# Patient Record
Sex: MALE | Race: BLACK OR AFRICAN AMERICAN | NOT HISPANIC OR LATINO
[De-identification: names, ages, dates, MRNs, and addresses within clinical notes are randomized per-mention and may not be internally consistent; named-entity substitution may affect disease eponyms.]

---

## 2017-02-28 ENCOUNTER — APPOINTMENT (OUTPATIENT)
Dept: OPHTHALMOLOGY | Facility: CLINIC | Age: 72
End: 2017-02-28

## 2017-03-06 ENCOUNTER — APPOINTMENT (OUTPATIENT)
Dept: OPHTHALMOLOGY | Facility: CLINIC | Age: 72
End: 2017-03-06

## 2017-03-22 ENCOUNTER — APPOINTMENT (OUTPATIENT)
Dept: OPHTHALMOLOGY | Facility: CLINIC | Age: 72
End: 2017-03-22

## 2017-04-18 ENCOUNTER — APPOINTMENT (OUTPATIENT)
Dept: OPHTHALMOLOGY | Facility: CLINIC | Age: 72
End: 2017-04-18

## 2017-04-24 ENCOUNTER — APPOINTMENT (OUTPATIENT)
Dept: OPHTHALMOLOGY | Facility: CLINIC | Age: 72
End: 2017-04-24

## 2017-06-26 ENCOUNTER — APPOINTMENT (OUTPATIENT)
Dept: OPHTHALMOLOGY | Facility: CLINIC | Age: 72
End: 2017-06-26

## 2017-08-16 ENCOUNTER — APPOINTMENT (OUTPATIENT)
Dept: OPHTHALMOLOGY | Facility: CLINIC | Age: 72
End: 2017-08-16
Payer: COMMERCIAL

## 2017-08-16 PROCEDURE — 92133 CPTRZD OPH DX IMG PST SGM ON: CPT

## 2017-08-16 PROCEDURE — 92012 INTRM OPH EXAM EST PATIENT: CPT

## 2017-08-16 PROCEDURE — 92083 EXTENDED VISUAL FIELD XM: CPT

## 2017-09-13 ENCOUNTER — APPOINTMENT (OUTPATIENT)
Dept: OPHTHALMOLOGY | Facility: CLINIC | Age: 72
End: 2017-09-13

## 2017-10-18 ENCOUNTER — APPOINTMENT (OUTPATIENT)
Dept: OPHTHALMOLOGY | Facility: CLINIC | Age: 72
End: 2017-10-18
Payer: COMMERCIAL

## 2017-10-18 PROCEDURE — 92012 INTRM OPH EXAM EST PATIENT: CPT

## 2017-10-18 PROCEDURE — 92015 DETERMINE REFRACTIVE STATE: CPT

## 2018-01-10 ENCOUNTER — APPOINTMENT (OUTPATIENT)
Dept: OPHTHALMOLOGY | Facility: CLINIC | Age: 73
End: 2018-01-10
Payer: COMMERCIAL

## 2018-01-10 PROCEDURE — 92226: CPT | Mod: RT

## 2018-01-10 PROCEDURE — 92014 COMPRE OPH EXAM EST PT 1/>: CPT

## 2018-05-09 ENCOUNTER — APPOINTMENT (OUTPATIENT)
Dept: OPHTHALMOLOGY | Facility: CLINIC | Age: 73
End: 2018-05-09

## 2018-05-22 ENCOUNTER — APPOINTMENT (OUTPATIENT)
Dept: OPHTHALMOLOGY | Facility: CLINIC | Age: 73
End: 2018-05-22
Payer: COMMERCIAL

## 2018-05-22 PROCEDURE — 92012 INTRM OPH EXAM EST PATIENT: CPT

## 2018-05-22 PROCEDURE — 92083 EXTENDED VISUAL FIELD XM: CPT

## 2018-08-21 ENCOUNTER — APPOINTMENT (OUTPATIENT)
Dept: OPHTHALMOLOGY | Facility: CLINIC | Age: 73
End: 2018-08-21
Payer: COMMERCIAL

## 2018-08-21 PROCEDURE — 92012 INTRM OPH EXAM EST PATIENT: CPT

## 2018-08-21 PROCEDURE — 92025 CPTRIZED CORNEAL TOPOGRAPHY: CPT

## 2018-08-21 PROCEDURE — 92133 CPTRZD OPH DX IMG PST SGM ON: CPT

## 2018-12-18 ENCOUNTER — APPOINTMENT (OUTPATIENT)
Dept: OPHTHALMOLOGY | Facility: CLINIC | Age: 73
End: 2018-12-18
Payer: COMMERCIAL

## 2018-12-18 PROCEDURE — 92083 EXTENDED VISUAL FIELD XM: CPT

## 2018-12-18 PROCEDURE — 92012 INTRM OPH EXAM EST PATIENT: CPT

## 2019-01-07 ENCOUNTER — APPOINTMENT (OUTPATIENT)
Dept: OPHTHALMOLOGY | Facility: CLINIC | Age: 74
End: 2019-01-07
Payer: COMMERCIAL

## 2019-01-07 PROCEDURE — 92012 INTRM OPH EXAM EST PATIENT: CPT

## 2019-05-09 ENCOUNTER — APPOINTMENT (OUTPATIENT)
Dept: OPHTHALMOLOGY | Facility: CLINIC | Age: 74
End: 2019-05-09
Payer: COMMERCIAL

## 2019-05-09 ENCOUNTER — NON-APPOINTMENT (OUTPATIENT)
Age: 74
End: 2019-05-09

## 2019-05-09 PROCEDURE — 92250 FUNDUS PHOTOGRAPHY W/I&R: CPT

## 2019-05-09 PROCEDURE — 92083 EXTENDED VISUAL FIELD XM: CPT

## 2019-05-09 PROCEDURE — 92014 COMPRE OPH EXAM EST PT 1/>: CPT

## 2019-09-12 ENCOUNTER — NON-APPOINTMENT (OUTPATIENT)
Age: 74
End: 2019-09-12

## 2019-09-12 ENCOUNTER — APPOINTMENT (OUTPATIENT)
Dept: OPHTHALMOLOGY | Facility: CLINIC | Age: 74
End: 2019-09-12
Payer: COMMERCIAL

## 2019-09-12 PROCEDURE — 92133 CPTRZD OPH DX IMG PST SGM ON: CPT

## 2019-09-12 PROCEDURE — 92012 INTRM OPH EXAM EST PATIENT: CPT

## 2020-09-09 ENCOUNTER — APPOINTMENT (OUTPATIENT)
Dept: OPHTHALMOLOGY | Facility: CLINIC | Age: 75
End: 2020-09-09

## 2021-07-08 ENCOUNTER — TRANSCRIPTION ENCOUNTER (OUTPATIENT)
Age: 76
End: 2021-07-08

## 2021-11-28 ENCOUNTER — INPATIENT (INPATIENT)
Facility: HOSPITAL | Age: 76
LOS: 4 days | Discharge: HOME | End: 2021-12-03
Attending: INTERNAL MEDICINE | Admitting: INTERNAL MEDICINE
Payer: COMMERCIAL

## 2021-11-28 VITALS
OXYGEN SATURATION: 100 % | DIASTOLIC BLOOD PRESSURE: 66 MMHG | SYSTOLIC BLOOD PRESSURE: 146 MMHG | RESPIRATION RATE: 18 BRPM | HEART RATE: 82 BPM | TEMPERATURE: 100 F

## 2021-11-28 DIAGNOSIS — I25.701 ATHEROSCLEROSIS OF CORONARY ARTERY BYPASS GRAFT(S), UNSPECIFIED, WITH ANGINA PECTORIS WITH DOCUMENTED SPASM: Chronic | ICD-10-CM

## 2021-11-28 DIAGNOSIS — N40.0 BENIGN PROSTATIC HYPERPLASIA WITHOUT LOWER URINARY TRACT SYMPTOMS: ICD-10-CM

## 2021-11-28 DIAGNOSIS — I25.10 ATHEROSCLEROTIC HEART DISEASE OF NATIVE CORONARY ARTERY WITHOUT ANGINA PECTORIS: ICD-10-CM

## 2021-11-28 DIAGNOSIS — N39.0 URINARY TRACT INFECTION, SITE NOT SPECIFIED: ICD-10-CM

## 2021-11-28 DIAGNOSIS — E27.8 OTHER SPECIFIED DISORDERS OF ADRENAL GLAND: ICD-10-CM

## 2021-11-28 DIAGNOSIS — I10 ESSENTIAL (PRIMARY) HYPERTENSION: ICD-10-CM

## 2021-11-28 DIAGNOSIS — Z29.9 ENCOUNTER FOR PROPHYLACTIC MEASURES, UNSPECIFIED: ICD-10-CM

## 2021-11-28 DIAGNOSIS — A41.9 SEPSIS, UNSPECIFIED ORGANISM: ICD-10-CM

## 2021-11-28 LAB
ALBUMIN SERPL ELPH-MCNC: 3.2 G/DL — LOW (ref 3.3–5)
ALP SERPL-CCNC: 115 U/L — SIGNIFICANT CHANGE UP (ref 40–120)
ALT FLD-CCNC: 34 U/L — SIGNIFICANT CHANGE UP (ref 4–41)
ANION GAP SERPL CALC-SCNC: 11 MMOL/L — SIGNIFICANT CHANGE UP (ref 7–14)
APPEARANCE UR: ABNORMAL
AST SERPL-CCNC: 24 U/L — SIGNIFICANT CHANGE UP (ref 4–40)
BASE EXCESS BLDV CALC-SCNC: 0.6 MMOL/L — SIGNIFICANT CHANGE UP (ref -2–3)
BASOPHILS # BLD AUTO: 0.02 K/UL — SIGNIFICANT CHANGE UP (ref 0–0.2)
BASOPHILS NFR BLD AUTO: 0.1 % — SIGNIFICANT CHANGE UP (ref 0–2)
BILIRUB SERPL-MCNC: 1.4 MG/DL — HIGH (ref 0.2–1.2)
BILIRUB UR-MCNC: NEGATIVE — SIGNIFICANT CHANGE UP
BUN SERPL-MCNC: 9 MG/DL — SIGNIFICANT CHANGE UP (ref 7–23)
CA-I SERPL-SCNC: 1.25 MMOL/L — SIGNIFICANT CHANGE UP (ref 1.15–1.33)
CALCIUM SERPL-MCNC: 9.5 MG/DL — SIGNIFICANT CHANGE UP (ref 8.4–10.5)
CHLORIDE BLDV-SCNC: 100 MMOL/L — SIGNIFICANT CHANGE UP (ref 96–108)
CHLORIDE SERPL-SCNC: 99 MMOL/L — SIGNIFICANT CHANGE UP (ref 98–107)
CO2 BLDV-SCNC: 25.4 MMOL/L — SIGNIFICANT CHANGE UP (ref 22–26)
CO2 SERPL-SCNC: 23 MMOL/L — SIGNIFICANT CHANGE UP (ref 22–31)
COLOR SPEC: ABNORMAL
CREAT SERPL-MCNC: 1.03 MG/DL — SIGNIFICANT CHANGE UP (ref 0.5–1.3)
DIFF PNL FLD: ABNORMAL
EOSINOPHIL # BLD AUTO: 0 K/UL — SIGNIFICANT CHANGE UP (ref 0–0.5)
EOSINOPHIL NFR BLD AUTO: 0 % — SIGNIFICANT CHANGE UP (ref 0–6)
GAS PNL BLDV: 132 MMOL/L — LOW (ref 136–145)
GAS PNL BLDV: SIGNIFICANT CHANGE UP
GLUCOSE BLDV-MCNC: 113 MG/DL — HIGH (ref 70–99)
GLUCOSE SERPL-MCNC: 109 MG/DL — HIGH (ref 70–99)
GLUCOSE UR QL: NEGATIVE — SIGNIFICANT CHANGE UP
HCO3 BLDV-SCNC: 24 MMOL/L — SIGNIFICANT CHANGE UP (ref 22–29)
HCT VFR BLD CALC: 32.3 % — LOW (ref 39–50)
HCT VFR BLDA CALC: 34 % — LOW (ref 39–51)
HGB BLD CALC-MCNC: 11.4 G/DL — LOW (ref 13–17)
HGB BLD-MCNC: 11.4 G/DL — LOW (ref 13–17)
IANC: 13.31 K/UL — HIGH (ref 1.5–8.5)
IMM GRANULOCYTES NFR BLD AUTO: 1.5 % — SIGNIFICANT CHANGE UP (ref 0–1.5)
KETONES UR-MCNC: ABNORMAL
LACTATE BLDV-MCNC: 1.5 MMOL/L — SIGNIFICANT CHANGE UP (ref 0.5–2)
LEUKOCYTE ESTERASE UR-ACNC: ABNORMAL
LYMPHOCYTES # BLD AUTO: 0.86 K/UL — LOW (ref 1–3.3)
LYMPHOCYTES # BLD AUTO: 5.6 % — LOW (ref 13–44)
MCHC RBC-ENTMCNC: 33.7 PG — SIGNIFICANT CHANGE UP (ref 27–34)
MCHC RBC-ENTMCNC: 35.3 GM/DL — SIGNIFICANT CHANGE UP (ref 32–36)
MCV RBC AUTO: 95.6 FL — SIGNIFICANT CHANGE UP (ref 80–100)
MONOCYTES # BLD AUTO: 1 K/UL — HIGH (ref 0–0.9)
MONOCYTES NFR BLD AUTO: 6.5 % — SIGNIFICANT CHANGE UP (ref 2–14)
NEUTROPHILS # BLD AUTO: 13.31 K/UL — HIGH (ref 1.8–7.4)
NEUTROPHILS NFR BLD AUTO: 86.3 % — HIGH (ref 43–77)
NITRITE UR-MCNC: NEGATIVE — SIGNIFICANT CHANGE UP
NRBC # BLD: 0 /100 WBCS — SIGNIFICANT CHANGE UP
NRBC # FLD: 0 K/UL — SIGNIFICANT CHANGE UP
PCO2 BLDV: 35 MMHG — LOW (ref 42–55)
PH BLDV: 7.45 — HIGH (ref 7.32–7.43)
PH UR: 6.5 — SIGNIFICANT CHANGE UP (ref 5–8)
PLATELET # BLD AUTO: 143 K/UL — LOW (ref 150–400)
PO2 BLDV: 41 MMHG — SIGNIFICANT CHANGE UP
POTASSIUM BLDV-SCNC: 3.4 MMOL/L — LOW (ref 3.5–5.1)
POTASSIUM SERPL-MCNC: 3.7 MMOL/L — SIGNIFICANT CHANGE UP (ref 3.5–5.3)
POTASSIUM SERPL-SCNC: 3.7 MMOL/L — SIGNIFICANT CHANGE UP (ref 3.5–5.3)
PROT SERPL-MCNC: 6.8 G/DL — SIGNIFICANT CHANGE UP (ref 6–8.3)
PROT UR-MCNC: ABNORMAL
RBC # BLD: 3.38 M/UL — LOW (ref 4.2–5.8)
RBC # FLD: 11.7 % — SIGNIFICANT CHANGE UP (ref 10.3–14.5)
SAO2 % BLDV: 71.1 % — SIGNIFICANT CHANGE UP
SARS-COV-2 RNA SPEC QL NAA+PROBE: SIGNIFICANT CHANGE UP
SODIUM SERPL-SCNC: 133 MMOL/L — LOW (ref 135–145)
SP GR SPEC: 1.01 — SIGNIFICANT CHANGE UP (ref 1–1.05)
UROBILINOGEN FLD QL: SIGNIFICANT CHANGE UP
WBC # BLD: 15.42 K/UL — HIGH (ref 3.8–10.5)
WBC # FLD AUTO: 15.42 K/UL — HIGH (ref 3.8–10.5)

## 2021-11-28 PROCEDURE — 74177 CT ABD & PELVIS W/CONTRAST: CPT | Mod: 26,MA

## 2021-11-28 PROCEDURE — 99223 1ST HOSP IP/OBS HIGH 75: CPT

## 2021-11-28 PROCEDURE — 71045 X-RAY EXAM CHEST 1 VIEW: CPT | Mod: 26

## 2021-11-28 PROCEDURE — 99285 EMERGENCY DEPT VISIT HI MDM: CPT

## 2021-11-28 RX ORDER — PHENAZOPYRIDINE HCL 100 MG
100 TABLET ORAL EVERY 8 HOURS
Refills: 0 | Status: COMPLETED | OUTPATIENT
Start: 2021-11-28 | End: 2021-11-30

## 2021-11-28 RX ORDER — KETOROLAC TROMETHAMINE 30 MG/ML
15 SYRINGE (ML) INJECTION ONCE
Refills: 0 | Status: DISCONTINUED | OUTPATIENT
Start: 2021-11-28 | End: 2021-11-28

## 2021-11-28 RX ORDER — ENOXAPARIN SODIUM 100 MG/ML
40 INJECTION SUBCUTANEOUS EVERY 24 HOURS
Refills: 0 | Status: DISCONTINUED | OUTPATIENT
Start: 2021-11-28 | End: 2021-12-03

## 2021-11-28 RX ORDER — PIPERACILLIN AND TAZOBACTAM 4; .5 G/20ML; G/20ML
3.38 INJECTION, POWDER, LYOPHILIZED, FOR SOLUTION INTRAVENOUS EVERY 8 HOURS
Refills: 0 | Status: DISCONTINUED | OUTPATIENT
Start: 2021-11-28 | End: 2021-12-03

## 2021-11-28 RX ORDER — TAMSULOSIN HYDROCHLORIDE 0.4 MG/1
0.4 CAPSULE ORAL AT BEDTIME
Refills: 0 | Status: DISCONTINUED | OUTPATIENT
Start: 2021-11-28 | End: 2021-12-03

## 2021-11-28 RX ORDER — PHENAZOPYRIDINE HCL 100 MG
100 TABLET ORAL EVERY 8 HOURS
Refills: 0 | Status: DISCONTINUED | OUTPATIENT
Start: 2021-11-28 | End: 2021-11-28

## 2021-11-28 RX ORDER — FINASTERIDE 5 MG/1
5 TABLET, FILM COATED ORAL DAILY
Refills: 0 | Status: DISCONTINUED | OUTPATIENT
Start: 2021-11-28 | End: 2021-12-03

## 2021-11-28 RX ORDER — CEFTRIAXONE 500 MG/1
1000 INJECTION, POWDER, FOR SOLUTION INTRAMUSCULAR; INTRAVENOUS ONCE
Refills: 0 | Status: COMPLETED | OUTPATIENT
Start: 2021-11-28 | End: 2021-11-28

## 2021-11-28 RX ORDER — PIPERACILLIN AND TAZOBACTAM 4; .5 G/20ML; G/20ML
3.38 INJECTION, POWDER, LYOPHILIZED, FOR SOLUTION INTRAVENOUS ONCE
Refills: 0 | Status: COMPLETED | OUTPATIENT
Start: 2021-11-28 | End: 2021-11-28

## 2021-11-28 RX ORDER — PHENAZOPYRIDINE HCL 100 MG
100 TABLET ORAL ONCE
Refills: 0 | Status: COMPLETED | OUTPATIENT
Start: 2021-11-28 | End: 2021-11-28

## 2021-11-28 RX ORDER — SODIUM CHLORIDE 9 MG/ML
1000 INJECTION INTRAMUSCULAR; INTRAVENOUS; SUBCUTANEOUS ONCE
Refills: 0 | Status: COMPLETED | OUTPATIENT
Start: 2021-11-28 | End: 2021-11-28

## 2021-11-28 RX ORDER — ACETAMINOPHEN 500 MG
650 TABLET ORAL EVERY 6 HOURS
Refills: 0 | Status: DISCONTINUED | OUTPATIENT
Start: 2021-11-28 | End: 2021-12-03

## 2021-11-28 RX ORDER — ACETAMINOPHEN 500 MG
650 TABLET ORAL ONCE
Refills: 0 | Status: COMPLETED | OUTPATIENT
Start: 2021-11-28 | End: 2021-11-28

## 2021-11-28 RX ADMIN — TAMSULOSIN HYDROCHLORIDE 0.4 MILLIGRAM(S): 0.4 CAPSULE ORAL at 21:38

## 2021-11-28 RX ADMIN — SODIUM CHLORIDE 1000 MILLILITER(S): 9 INJECTION INTRAMUSCULAR; INTRAVENOUS; SUBCUTANEOUS at 13:24

## 2021-11-28 RX ADMIN — Medication 100 MILLIGRAM(S): at 13:33

## 2021-11-28 RX ADMIN — Medication 100 MILLIGRAM(S): at 21:38

## 2021-11-28 RX ADMIN — Medication 15 MILLIGRAM(S): at 13:25

## 2021-11-28 RX ADMIN — PIPERACILLIN AND TAZOBACTAM 200 GRAM(S): 4; .5 INJECTION, POWDER, LYOPHILIZED, FOR SOLUTION INTRAVENOUS at 16:17

## 2021-11-28 RX ADMIN — PIPERACILLIN AND TAZOBACTAM 25 GRAM(S): 4; .5 INJECTION, POWDER, LYOPHILIZED, FOR SOLUTION INTRAVENOUS at 20:18

## 2021-11-28 RX ADMIN — CEFTRIAXONE 100 MILLIGRAM(S): 500 INJECTION, POWDER, FOR SOLUTION INTRAMUSCULAR; INTRAVENOUS at 13:25

## 2021-11-28 RX ADMIN — SODIUM CHLORIDE 1000 MILLILITER(S): 9 INJECTION INTRAMUSCULAR; INTRAVENOUS; SUBCUTANEOUS at 14:30

## 2021-11-28 RX ADMIN — ENOXAPARIN SODIUM 40 MILLIGRAM(S): 100 INJECTION SUBCUTANEOUS at 21:39

## 2021-11-28 RX ADMIN — Medication 650 MILLIGRAM(S): at 13:15

## 2021-11-28 NOTE — ED PROVIDER NOTE - CLINICAL SUMMARY MEDICAL DECISION MAKING FREE TEXT BOX
74yo male pt with PMH HTN who complains of dysuria and pelvic pain since 4 days ago that has been worsening since. No urinary retention seen on US. Most likely UTI. will assess with labs, UA, and will empirically treat with antibiotics.

## 2021-11-28 NOTE — PHARMACOTHERAPY INTERVENTION NOTE - COMMENTS
patient fills at multiple pharmacies   unable to confirm meds with [facility] as pharmacy closed  however, med list updated with list provided with [facility] only

## 2021-11-28 NOTE — H&P ADULT - NSICDXPASTMEDICALHX_GEN_ALL_CORE_FT
PAST MEDICAL HISTORY:  BPH (benign prostatic hyperplasia)     CAD (coronary artery disease)     HTN (hypertension)

## 2021-11-28 NOTE — H&P ADULT - NSHPLABSRESULTS_GEN_ALL_CORE
11.4   15.42 )-----------( 143      ( 2021 13:33 )             32.3         133<L>  |  99  |  9   ----------------------------<  109<H>  3.7   |  23  |  1.03    Ca    9.5      2021 13:33    TPro  6.8  /  Alb  3.2<L>  /  TBili  1.4<H>  /  DBili  x   /  AST  24  /  ALT  34  /  AlkPhos  115        Urinalysis Basic - ( 2021 13:33 )    Color: Wendy / Appearance: Slightly Turbid / S.011 / pH: x  Gluc: x / Ketone: Moderate  / Bili: Negative / Urobili: <2 mg/dL   Blood: x / Protein: 100 mg/dL / Nitrite: Negative   Leuk Esterase: Moderate / RBC: 6-12 /HPF / WBC 4-6 /HPF   Sq Epi: x / Non Sq Epi: 3 /HPF / Bacteria: Negative    LIVER FUNCTIONS - ( 2021 13:33 )  Alb: 3.2 g/dL / Pro: 6.8 g/dL / ALK PHOS: 115 U/L / ALT: 34 U/L / AST: 24 U/L / GGT: x           CXR: clear lungs.  CT Abd+Pelvis w/IV con: Acute cystitis. Prostatomegaly with surrounding inflammation, which may be on the basis of prostatitis versus recent instrumentation. No abscess, free air or free fluid. 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 Indeterminate 2.7 cm left adrenal gland mass. Urinary Jeffers catheter in place.

## 2021-11-28 NOTE — H&P ADULT - ASSESSMENT
74 yo male PMHx CAD, s/p CABG x3, HTN and recently diagnosed BPH (w/ duarte in place) p/w dysuria x 5 days, now accompanied by fever and chills admitted to medicine for Sepsis secondary to UTI in a setting of BPH.

## 2021-11-28 NOTE — H&P ADULT - NSHPSOCIALHISTORY_GEN_ALL_CORE
Pt , lives with spouse. Denies smoking, drinking or drugs. Former smoker 1/2 ppd x 15 yrs, quit 10 years ago. Still works as a  and able to ambulate 2-3 blocks w/o difficulty.

## 2021-11-28 NOTE — H&P ADULT - ATTENDING COMMENTS
74 yo male PMHx CAD, s/p CABG x3, HTN and recently diagnosed BPH (w/ duarte in place) p/w dysuria x 5 days who is found to be septic in the setting of complicated ascending UTI with ureteropyelitis.     #Ureteropyelitis:   -fevers, tachycardia, and leukocytosis -- meets sepsis criteria  -previously on cipro -- given recent duarte, will need pseudomonas coverage; therefore, initiated Zosyn  -also with possible prostatomegaly with surrounding inflammation, which could signal prostatitis vs recent inflammation. Continue to monitor clinical course; given age, likely microbial etiology would be gram negative enterobacteriaceae   -given recent admission at [facility], would obtain records regarding any culture data  -f/u blood and urine cx    #BPH:   -c/w flomax and initiate finasteride    #Adrenal mass:   -o/p evaluation with adrenal mass protocol     Remainder of chronic problems as above

## 2021-11-28 NOTE — ED ADULT TRIAGE NOTE - CHIEF COMPLAINT QUOTE
Arrives from home reports has had 7 days of difficulty urinating, went to another facility and given medication and duarte catheter. But pt states duarte is not draining.

## 2021-11-28 NOTE — ED PROVIDER NOTE - ATTENDING CONTRIBUTION TO CARE
Dr. Mcclure:  I have personally performed a face to face bedside history and physical examination of this patient. I have discussed the history, examination, review of systems, assessment and plan of management with the resident. I have reviewed the electronic medical record and amended it to reflect my history, review of systems, physical exam, assessment and plan.    h/o BPH, was in urinary retention x 3 days, had a Jeffers placed, now c/o dysuria and pelvic pain. Dr. Mcclure:  I have personally performed a face to face bedside history and physical examination of this patient. I have discussed the history, examination, review of systems, assessment and plan of management with the resident. I have reviewed the electronic medical record and amended it to reflect my history, review of systems, physical exam, assessment and plan.    75M h/o HTN, BPH, was in urinary retention x 3 days, had a Jeffers placed, now c/o dysuria and pelvic pain.  Tm 101 in ED.      Exam:  - nad  - rrr  - ctab   -abd soft ntnd    A/P  - fever, suspect urine source  - sepsis labs  - abx

## 2021-11-28 NOTE — ED ADULT TRIAGE NOTE - PAIN: PRESENCE, MLM
complains of pain/discomfort Tazorac Pregnancy And Lactation Text: This medication is not safe during pregnancy. It is unknown if this medication is excreted in breast milk.

## 2021-11-28 NOTE — ED PROVIDER NOTE - NS ED ROS FT
CONST: no fevers, no chills  EYES: no pain, no vision changes  ENT: no sore throat, no ear pain, no change in hearing  CV: no chest pain, no leg swelling  RESP: no shortness of breath, no cough  ABD: no abdominal pain, no nausea, no vomiting, no diarrhea  :  no flank pain, no hematuria. Pos dysuria and lower mid pelvic pain  MSK: no back pain, no extremity pain  NEURO: no headache or additional neurologic complaints  HEME: no easy bleeding

## 2021-11-28 NOTE — ED PROVIDER NOTE - OBJECTIVE STATEMENT
76yo male pt PMH HTN and BPH who presents to the ED with complains of dysuria and pelvic pain since 4 days ago. Patient was evaluated 4 days ago for urinary retention and a duarte catheter was placed. Patient now presenting w dysuria since that has been worsening w time. Denies chills, fever, abd pain, n/v/d, chest pain, SOB.

## 2021-11-28 NOTE — ED PROVIDER NOTE - NSICDXFAMILYHX_GEN_ALL_CORE_FT
FAMILY HISTORY:  Mother  Still living? Unknown  Family history of acute myocardial infarction, Age at diagnosis: Age Unknown

## 2021-11-28 NOTE — H&P ADULT - HISTORY OF PRESENT ILLNESS
74 yo male PMHx CAD, s/p CABG x3, HTN and recently diagnosed BPH (w/ duarte in place) p/w dysuria x 5 days. Last Wednesday pt developed dysuria with burning on urination. Pt described it as "shards of glass." On Thursday he went to [facility] and was admitted there where he had CT Scan done and was told by end of work  up that he has a new onset of BPH. They put a duarte in him and discharged him with leg bag, Cipro, Tamsulosin and Pyridium. He was discharged yesterday. This morning pt became severe at duarte-penile site, 10/10 sharp pain, accompanied by subjective fever and chills, hence came to [facility] ED for further evaluation. He denies back pain, chest pain, sob, palpitations, nausea, vomiting, diarrhea, abdominal pain, or hematuria.    In ED pt found be febrile with leukocytosis and neutrophilia.

## 2021-11-28 NOTE — ED ADULT NURSE NOTE - OBJECTIVE STATEMENT
Pt. is A+Ox4 and screaming/growling in pain. He c/o pain to inside of penis. He reports he has had 7 days of difficulty urinating, Went to another facility 4 days ok and was given ABX and a duarte catheter. But pt states duarte is not draining. Leaking noted around duarte.

## 2021-11-28 NOTE — ED PROVIDER NOTE - PHYSICAL EXAMINATION
GENERAL: Awake, alert, NAD  HEENT: NC/AT, moist mucous membranes  LUNGS: CTAB, no wheezes or crackles   CARDIAC: RRR, no m/r/g  ABDOMEN: Soft, normal BS, non distended, no rebound, no guarding. mildly tender to palpation of lower mid pelvis  BACK: No midline spinal tenderness, no CVA tenderness  EXT: No edema, no calf tenderness, 2+ DP pulses bilaterally, no deformities.  NEURO: A&Ox3. Moving all extremities.  SKIN: Warm and dry. No rash.

## 2021-11-28 NOTE — H&P ADULT - NSICDXPASTSURGICALHX_GEN_ALL_CORE_FT
PAST SURGICAL HISTORY:  Atherosclerosis of coronary artery bypass graft(s), unspecified, with angina pectoris with documented spasm CABG x 2012

## 2021-11-28 NOTE — ED PROVIDER NOTE - NSICDXPASTSURGICALHX_GEN_ALL_CORE_FT
PAST SURGICAL HISTORY:  Atherosclerosis of coronary artery bypass graft(s), unspecified, with angina pectoris with documented spasm

## 2021-11-28 NOTE — H&P ADULT - PROBLEM SELECTOR PLAN 1
-continue IV Zosyn 3.375 Q8h  -Tamsulosin for BPH  -Tylenol PRN pain and Fever  -Pyridium for dysuria for 2 more days  -Follow up Bld Cx and UCx  -F/U COVID PCR

## 2021-11-29 LAB
ALBUMIN SERPL ELPH-MCNC: 3 G/DL — LOW (ref 3.3–5)
ALP SERPL-CCNC: 122 U/L — HIGH (ref 40–120)
ALT FLD-CCNC: 30 U/L — SIGNIFICANT CHANGE UP (ref 4–41)
ANION GAP SERPL CALC-SCNC: 11 MMOL/L — SIGNIFICANT CHANGE UP (ref 7–14)
AST SERPL-CCNC: 25 U/L — SIGNIFICANT CHANGE UP (ref 4–40)
BILIRUB SERPL-MCNC: 1.2 MG/DL — SIGNIFICANT CHANGE UP (ref 0.2–1.2)
BUN SERPL-MCNC: 13 MG/DL — SIGNIFICANT CHANGE UP (ref 7–23)
CALCIUM SERPL-MCNC: 9.3 MG/DL — SIGNIFICANT CHANGE UP (ref 8.4–10.5)
CHLORIDE SERPL-SCNC: 101 MMOL/L — SIGNIFICANT CHANGE UP (ref 98–107)
CO2 SERPL-SCNC: 23 MMOL/L — SIGNIFICANT CHANGE UP (ref 22–31)
COVID-19 NUCLEOCAPSID GAM AB INTERP: NEGATIVE — SIGNIFICANT CHANGE UP
COVID-19 NUCLEOCAPSID TOTAL GAM ANTIBODY RESULT: 0.1 INDEX — SIGNIFICANT CHANGE UP
COVID-19 SPIKE DOMAIN AB INTERP: NEGATIVE — SIGNIFICANT CHANGE UP
COVID-19 SPIKE DOMAIN ANTIBODY RESULT: 0.4 U/ML — SIGNIFICANT CHANGE UP
CREAT SERPL-MCNC: 1.17 MG/DL — SIGNIFICANT CHANGE UP (ref 0.5–1.3)
CULTURE RESULTS: NO GROWTH — SIGNIFICANT CHANGE UP
GLUCOSE SERPL-MCNC: 84 MG/DL — SIGNIFICANT CHANGE UP (ref 70–99)
HCT VFR BLD CALC: 33.2 % — LOW (ref 39–50)
HGB BLD-MCNC: 11.6 G/DL — LOW (ref 13–17)
MCHC RBC-ENTMCNC: 33.8 PG — SIGNIFICANT CHANGE UP (ref 27–34)
MCHC RBC-ENTMCNC: 34.9 GM/DL — SIGNIFICANT CHANGE UP (ref 32–36)
MCV RBC AUTO: 96.8 FL — SIGNIFICANT CHANGE UP (ref 80–100)
NRBC # BLD: 0 /100 WBCS — SIGNIFICANT CHANGE UP
NRBC # FLD: 0 K/UL — SIGNIFICANT CHANGE UP
PLATELET # BLD AUTO: 156 K/UL — SIGNIFICANT CHANGE UP (ref 150–400)
POTASSIUM SERPL-MCNC: 3.5 MMOL/L — SIGNIFICANT CHANGE UP (ref 3.5–5.3)
POTASSIUM SERPL-SCNC: 3.5 MMOL/L — SIGNIFICANT CHANGE UP (ref 3.5–5.3)
PROT SERPL-MCNC: 6.8 G/DL — SIGNIFICANT CHANGE UP (ref 6–8.3)
RBC # BLD: 3.43 M/UL — LOW (ref 4.2–5.8)
RBC # FLD: 11.9 % — SIGNIFICANT CHANGE UP (ref 10.3–14.5)
SARS-COV-2 IGG+IGM SERPL QL IA: 0.1 INDEX — SIGNIFICANT CHANGE UP
SARS-COV-2 IGG+IGM SERPL QL IA: 0.4 U/ML — SIGNIFICANT CHANGE UP
SARS-COV-2 IGG+IGM SERPL QL IA: NEGATIVE — SIGNIFICANT CHANGE UP
SARS-COV-2 IGG+IGM SERPL QL IA: NEGATIVE — SIGNIFICANT CHANGE UP
SODIUM SERPL-SCNC: 135 MMOL/L — SIGNIFICANT CHANGE UP (ref 135–145)
SPECIMEN SOURCE: SIGNIFICANT CHANGE UP
WBC # BLD: 11.76 K/UL — HIGH (ref 3.8–10.5)
WBC # FLD AUTO: 11.76 K/UL — HIGH (ref 3.8–10.5)

## 2021-11-29 PROCEDURE — 99232 SBSQ HOSP IP/OBS MODERATE 35: CPT

## 2021-11-29 RX ORDER — KETOROLAC TROMETHAMINE 30 MG/ML
15 SYRINGE (ML) INJECTION ONCE
Refills: 0 | Status: DISCONTINUED | OUTPATIENT
Start: 2021-11-29 | End: 2021-11-29

## 2021-11-29 RX ORDER — INFLUENZA VIRUS VACCINE 15; 15; 15; 15 UG/.5ML; UG/.5ML; UG/.5ML; UG/.5ML
0.7 SUSPENSION INTRAMUSCULAR ONCE
Refills: 0 | Status: COMPLETED | OUTPATIENT
Start: 2021-11-29 | End: 2021-11-29

## 2021-11-29 RX ORDER — LIDOCAINE HCL 20 MG/ML
5 VIAL (ML) INJECTION DAILY
Refills: 0 | Status: DISCONTINUED | OUTPATIENT
Start: 2021-11-29 | End: 2021-11-29

## 2021-11-29 RX ORDER — LIDOCAINE 4 G/100G
1 CREAM TOPICAL DAILY
Refills: 0 | Status: DISCONTINUED | OUTPATIENT
Start: 2021-11-29 | End: 2021-12-03

## 2021-11-29 RX ADMIN — Medication 650 MILLIGRAM(S): at 02:00

## 2021-11-29 RX ADMIN — PIPERACILLIN AND TAZOBACTAM 25 GRAM(S): 4; .5 INJECTION, POWDER, LYOPHILIZED, FOR SOLUTION INTRAVENOUS at 21:19

## 2021-11-29 RX ADMIN — Medication 15 MILLIGRAM(S): at 21:19

## 2021-11-29 RX ADMIN — PIPERACILLIN AND TAZOBACTAM 25 GRAM(S): 4; .5 INJECTION, POWDER, LYOPHILIZED, FOR SOLUTION INTRAVENOUS at 13:12

## 2021-11-29 RX ADMIN — Medication 15 MILLIGRAM(S): at 14:21

## 2021-11-29 RX ADMIN — Medication 15 MILLIGRAM(S): at 21:49

## 2021-11-29 RX ADMIN — Medication 100 MILLIGRAM(S): at 21:20

## 2021-11-29 RX ADMIN — TAMSULOSIN HYDROCHLORIDE 0.4 MILLIGRAM(S): 0.4 CAPSULE ORAL at 21:18

## 2021-11-29 RX ADMIN — LIDOCAINE 1 APPLICATION(S): 4 CREAM TOPICAL at 04:40

## 2021-11-29 RX ADMIN — PIPERACILLIN AND TAZOBACTAM 25 GRAM(S): 4; .5 INJECTION, POWDER, LYOPHILIZED, FOR SOLUTION INTRAVENOUS at 05:31

## 2021-11-29 RX ADMIN — Medication 650 MILLIGRAM(S): at 01:04

## 2021-11-29 RX ADMIN — ENOXAPARIN SODIUM 40 MILLIGRAM(S): 100 INJECTION SUBCUTANEOUS at 21:18

## 2021-11-29 RX ADMIN — Medication 15 MILLIGRAM(S): at 13:39

## 2021-11-29 RX ADMIN — Medication 100 MILLIGRAM(S): at 05:31

## 2021-11-29 RX ADMIN — Medication 15 MILLIGRAM(S): at 06:05

## 2021-11-29 RX ADMIN — FINASTERIDE 5 MILLIGRAM(S): 5 TABLET, FILM COATED ORAL at 13:01

## 2021-11-29 RX ADMIN — Medication 100 MILLIGRAM(S): at 16:03

## 2021-11-29 NOTE — PHYSICAL THERAPY INITIAL EVALUATION ADULT - ADDITIONAL COMMENTS
Pt reports that he lives alone in a private house; although rents out a section; with no steps to enter; and a flight of stairs to negotiate to bedroom; (+)1 handrail. Prior to hospital admission pt was completely independent and used no assistive device with ambulation. Pt denies any recent falls.    Pt left comfortable in bed, NAD, all lines intact, all precautions maintained, with call bell in reach, and RN aware of PT evaluation.

## 2021-11-29 NOTE — PHYSICAL THERAPY INITIAL EVALUATION ADULT - PATIENT PROFILE REVIEW, REHAB EVAL
No Formal Activity Order in the Computer; spoke with PB Nj and PB Valenzuela prior to PT evaluation--> Pt OK for PT consult/OOB activity./yes

## 2021-11-29 NOTE — PHYSICAL THERAPY INITIAL EVALUATION ADULT - PERTINENT HX OF CURRENT PROBLEM, REHAB EVAL
76 yo male PMHx CAD, s/p CABG x3, HTN and recently diagnosed BPH (w/ duarte in place) p/w dysuria x 5 days, now accompanied by fever and chills admitted to medicine for Sepsis secondary to UTI in a setting of BPH.

## 2021-11-29 NOTE — PROGRESS NOTE ADULT - SUBJECTIVE AND OBJECTIVE BOX
PROGRESS NOTE:     Patient is a 76y old  Male who presents with a chief complaint of Urosepsis (2021 17:11)    SUBJECTIVE / OVERNIGHT EVENTS: Patient seen and evaluated at bedside. NO acute distress evident, no overnight events. Reports feeling well, no sob, chest pain, abd pain, nausea, vomiting, dizziness, palpitations.     ADDITIONAL REVIEW OF SYSTEMS:    MEDICATIONS  (STANDING):  enoxaparin Injectable 40 milliGRAM(s) SubCutaneous every 24 hours  finasteride 5 milliGRAM(s) Oral daily  influenza  Vaccine (HIGH DOSE) 0.7 milliLiter(s) IntraMuscular once  lidocaine 2% Gel 1 Application(s) Topical daily  phenazopyridine 100 milliGRAM(s) Oral every 8 hours  piperacillin/tazobactam IVPB.. 3.375 Gram(s) IV Intermittent every 8 hours  tamsulosin 0.4 milliGRAM(s) Oral at bedtime    MEDICATIONS  (PRN):  acetaminophen     Tablet .. 650 milliGRAM(s) Oral every 6 hours PRN Temp greater or equal to 38C (100.4F), Mild Pain (1 - 3), Moderate Pain (4 - 6)    CAPILLARY BLOOD GLUCOSE    I&O's Summary    PHYSICAL EXAM:  Vital Signs Last 24 Hrs  T(C): 37.1 (2021 13:00), Max: 37.1 (2021 13:00)  T(F): 98.8 (2021 13:00), Max: 98.8 (2021 13:00)  HR: 70 (2021 13:00) (60 - 70)  BP: 142/57 (2021 13:00) (105/52 - 142/57)  BP(mean): --  RR: 20 (2021 13:00) (18 - 20)  SpO2: 100% (2021 13:00) (96% - 100%)    CONSTITUTIONAL: NAD, well-developed, comfortable.  RESPIRATORY: Normal respiratory effort; lungs are clear to auscultation bilaterally  CARDIOVASCULAR: Regular rate and rhythm, normal S1 and S2  No lower extremity edema; Peripheral pulses are 2+ bilaterally  ABDOMEN: Nontender to palpation, normoactive bowel sounds  MUSCLOSKELETAL: no clubbing or cyanosis of digits; no joint swelling or tenderness to palpation  PSYCH: A+O to person, place, and time; affect appropriate    LABS: reviewed.                        11.6   11.76 )-----------( 156      ( 2021 07:09 )             33.2     11    135  |  101  |  13  ----------------------------<  84  3.5   |  23  |  1.17    Ca    9.3      2021 07:09    TPro  6.8  /  Alb  3.0<L>  /  TBili  1.2  /  DBili  x   /  AST  25  /  ALT  30  /  AlkPhos  122<H>      Urinalysis Basic - ( 2021 13:33 )    Color: Wendy / Appearance: Slightly Turbid / S.011 / pH: x  Gluc: x / Ketone: Moderate  / Bili: Negative / Urobili: <2 mg/dL   Blood: x / Protein: 100 mg/dL / Nitrite: Negative   Leuk Esterase: Moderate / RBC: 6-12 /HPF / WBC 4-6 /HPF   Sq Epi: x / Non Sq Epi: 3 /HPF / Bacteria: Negative    Culture - Urine (collected 2021 13:45)  Source: Clean Catch Clean Catch (Midstream)  Final Report (2021 15:46):    No growth    RADIOLOGY & ADDITIONAL TESTS:  Results Reviewed:   Imaging Personally Reviewed:  Electrocardiogram Personally Reviewed:    COORDINATION OF CARE:  Care Discussed with Consultants/Other Providers [Y/N]:  Prior or Outpatient Records Reviewed [Y/N]:

## 2021-11-30 LAB
ANION GAP SERPL CALC-SCNC: 9 MMOL/L — SIGNIFICANT CHANGE UP (ref 7–14)
BASOPHILS # BLD AUTO: 0.03 K/UL — SIGNIFICANT CHANGE UP (ref 0–0.2)
BASOPHILS NFR BLD AUTO: 0.3 % — SIGNIFICANT CHANGE UP (ref 0–2)
BUN SERPL-MCNC: 13 MG/DL — SIGNIFICANT CHANGE UP (ref 7–23)
CALCIUM SERPL-MCNC: 9.2 MG/DL — SIGNIFICANT CHANGE UP (ref 8.4–10.5)
CHLORIDE SERPL-SCNC: 104 MMOL/L — SIGNIFICANT CHANGE UP (ref 98–107)
CO2 SERPL-SCNC: 25 MMOL/L — SIGNIFICANT CHANGE UP (ref 22–31)
CREAT SERPL-MCNC: 1.19 MG/DL — SIGNIFICANT CHANGE UP (ref 0.5–1.3)
EOSINOPHIL # BLD AUTO: 0.04 K/UL — SIGNIFICANT CHANGE UP (ref 0–0.5)
EOSINOPHIL NFR BLD AUTO: 0.5 % — SIGNIFICANT CHANGE UP (ref 0–6)
GLUCOSE SERPL-MCNC: 92 MG/DL — SIGNIFICANT CHANGE UP (ref 70–99)
HCT VFR BLD CALC: 32 % — LOW (ref 39–50)
HGB BLD-MCNC: 10.8 G/DL — LOW (ref 13–17)
IANC: 6.8 K/UL — SIGNIFICANT CHANGE UP (ref 1.5–8.5)
IMM GRANULOCYTES NFR BLD AUTO: 0.9 % — SIGNIFICANT CHANGE UP (ref 0–1.5)
LYMPHOCYTES # BLD AUTO: 1.16 K/UL — SIGNIFICANT CHANGE UP (ref 1–3.3)
LYMPHOCYTES # BLD AUTO: 13.5 % — SIGNIFICANT CHANGE UP (ref 13–44)
MAGNESIUM SERPL-MCNC: 2.2 MG/DL — SIGNIFICANT CHANGE UP (ref 1.6–2.6)
MCHC RBC-ENTMCNC: 33.8 GM/DL — SIGNIFICANT CHANGE UP (ref 32–36)
MCHC RBC-ENTMCNC: 33.8 PG — SIGNIFICANT CHANGE UP (ref 27–34)
MCV RBC AUTO: 100 FL — SIGNIFICANT CHANGE UP (ref 80–100)
MONOCYTES # BLD AUTO: 0.48 K/UL — SIGNIFICANT CHANGE UP (ref 0–0.9)
MONOCYTES NFR BLD AUTO: 5.6 % — SIGNIFICANT CHANGE UP (ref 2–14)
NEUTROPHILS # BLD AUTO: 6.8 K/UL — SIGNIFICANT CHANGE UP (ref 1.8–7.4)
NEUTROPHILS NFR BLD AUTO: 79.2 % — HIGH (ref 43–77)
NRBC # BLD: 0 /100 WBCS — SIGNIFICANT CHANGE UP
NRBC # FLD: 0 K/UL — SIGNIFICANT CHANGE UP
PHOSPHATE SERPL-MCNC: 3.2 MG/DL — SIGNIFICANT CHANGE UP (ref 2.5–4.5)
PLATELET # BLD AUTO: 177 K/UL — SIGNIFICANT CHANGE UP (ref 150–400)
POTASSIUM SERPL-MCNC: 3.6 MMOL/L — SIGNIFICANT CHANGE UP (ref 3.5–5.3)
POTASSIUM SERPL-SCNC: 3.6 MMOL/L — SIGNIFICANT CHANGE UP (ref 3.5–5.3)
RBC # BLD: 3.2 M/UL — LOW (ref 4.2–5.8)
RBC # FLD: 11.9 % — SIGNIFICANT CHANGE UP (ref 10.3–14.5)
SODIUM SERPL-SCNC: 138 MMOL/L — SIGNIFICANT CHANGE UP (ref 135–145)
WBC # BLD: 8.59 K/UL — SIGNIFICANT CHANGE UP (ref 3.8–10.5)
WBC # FLD AUTO: 8.59 K/UL — SIGNIFICANT CHANGE UP (ref 3.8–10.5)

## 2021-11-30 PROCEDURE — 99232 SBSQ HOSP IP/OBS MODERATE 35: CPT

## 2021-11-30 RX ORDER — PHENAZOPYRIDINE HCL 100 MG
200 TABLET ORAL EVERY 8 HOURS
Refills: 0 | Status: DISCONTINUED | OUTPATIENT
Start: 2021-11-30 | End: 2021-12-02

## 2021-11-30 RX ORDER — KETOROLAC TROMETHAMINE 30 MG/ML
30 SYRINGE (ML) INJECTION ONCE
Refills: 0 | Status: DISCONTINUED | OUTPATIENT
Start: 2021-11-30 | End: 2021-11-30

## 2021-11-30 RX ADMIN — Medication 100 MILLIGRAM(S): at 05:27

## 2021-11-30 RX ADMIN — PIPERACILLIN AND TAZOBACTAM 25 GRAM(S): 4; .5 INJECTION, POWDER, LYOPHILIZED, FOR SOLUTION INTRAVENOUS at 21:57

## 2021-11-30 RX ADMIN — Medication 100 MILLIGRAM(S): at 17:52

## 2021-11-30 RX ADMIN — Medication 30 MILLIGRAM(S): at 11:05

## 2021-11-30 RX ADMIN — ENOXAPARIN SODIUM 40 MILLIGRAM(S): 100 INJECTION SUBCUTANEOUS at 21:58

## 2021-11-30 RX ADMIN — Medication 30 MILLIGRAM(S): at 10:46

## 2021-11-30 RX ADMIN — PIPERACILLIN AND TAZOBACTAM 25 GRAM(S): 4; .5 INJECTION, POWDER, LYOPHILIZED, FOR SOLUTION INTRAVENOUS at 13:52

## 2021-11-30 RX ADMIN — TAMSULOSIN HYDROCHLORIDE 0.4 MILLIGRAM(S): 0.4 CAPSULE ORAL at 21:58

## 2021-11-30 RX ADMIN — LIDOCAINE 1 APPLICATION(S): 4 CREAM TOPICAL at 05:28

## 2021-11-30 RX ADMIN — FINASTERIDE 5 MILLIGRAM(S): 5 TABLET, FILM COATED ORAL at 13:51

## 2021-11-30 RX ADMIN — PIPERACILLIN AND TAZOBACTAM 25 GRAM(S): 4; .5 INJECTION, POWDER, LYOPHILIZED, FOR SOLUTION INTRAVENOUS at 05:28

## 2021-11-30 NOTE — PROGRESS NOTE ADULT - SUBJECTIVE AND OBJECTIVE BOX
PROGRESS NOTE:     Patient is a 76y old  Male who presents with a chief complaint of Urosepsis (29 Nov 2021 17:13)    SUBJECTIVE / OVERNIGHT EVENTS: Patient seen and evaluated at bedside. No acute distress evident, no sob, chest pain, abd pain, nausea, vomiting.     ADDITIONAL REVIEW OF SYSTEMS:    MEDICATIONS  (STANDING):  enoxaparin Injectable 40 milliGRAM(s) SubCutaneous every 24 hours  finasteride 5 milliGRAM(s) Oral daily  influenza  Vaccine (HIGH DOSE) 0.7 milliLiter(s) IntraMuscular once  lidocaine 2% Gel 1 Application(s) Topical daily  piperacillin/tazobactam IVPB.. 3.375 Gram(s) IV Intermittent every 8 hours  tamsulosin 0.4 milliGRAM(s) Oral at bedtime    MEDICATIONS  (PRN):  acetaminophen     Tablet .. 650 milliGRAM(s) Oral every 6 hours PRN Temp greater or equal to 38C (100.4F), Mild Pain (1 - 3), Moderate Pain (4 - 6)    CAPILLARY BLOOD GLUCOSE    I&O's Summary    29 Nov 2021 07:01  -  30 Nov 2021 07:00  --------------------------------------------------------  IN: 0 mL / OUT: 850 mL / NET: -850 mL    PHYSICAL EXAM:  Vital Signs Last 24 Hrs  T(C): 37 (30 Nov 2021 17:53), Max: 37.3 (30 Nov 2021 05:40)  T(F): 98.6 (30 Nov 2021 17:53), Max: 99.2 (30 Nov 2021 05:40)  HR: 70 (30 Nov 2021 17:53) (66 - 80)  BP: 133/62 (30 Nov 2021 17:53) (106/61 - 146/64)  BP(mean): --  RR: 18 (30 Nov 2021 17:53) (18 - 19)  SpO2: 99% (30 Nov 2021 17:53) (96% - 99%)    CONSTITUTIONAL: NAD, well-developed, comfortable appearing.   RESPIRATORY: Normal respiratory effort; lungs are clear to auscultation bilaterally  CARDIOVASCULAR: Regular rate and rhythm, normal S1 and S2  No lower extremity edema; Peripheral pulses are 2+ bilaterally  ABDOMEN: Nontender to palpation, normoactive bowel sounds  MUSCLOSKELETAL: no clubbing or cyanosis of digits; no joint swelling or tenderness to palpation  PSYCH: A+O to person, place, and time; affect appropriate    LABS: reviewed.                         10.8   8.59  )-----------( 177      ( 30 Nov 2021 06:50 )             32.0     11-30    138  |  104  |  13  ----------------------------<  92  3.6   |  25  |  1.19    Ca    9.2      30 Nov 2021 06:50  Phos  3.2     11-30  Mg     2.20     11-30    TPro  6.8  /  Alb  3.0<L>  /  TBili  1.2  /  DBili  x   /  AST  25  /  ALT  30  /  AlkPhos  122<H>  11-29    Culture - Blood (collected 28 Nov 2021 18:29)  Source: .Blood Blood-Peripheral  Preliminary Report (29 Nov 2021 19:01):    No growth to date.    Culture - Blood (collected 28 Nov 2021 18:29)  Source: .Blood Blood-Peripheral  Preliminary Report (29 Nov 2021 19:01):    No growth to date.    Culture - Urine (collected 28 Nov 2021 13:45)  Source: Clean Catch Clean Catch (Midstream)  Final Report (29 Nov 2021 15:46):    No growth    RADIOLOGY & ADDITIONAL TESTS:  Results Reviewed:   Imaging Personally Reviewed:  Electrocardiogram Personally Reviewed:    COORDINATION OF CARE:  Care Discussed with Consultants/Other Providers [Y/N]:  Prior or Outpatient Records Reviewed [Y/N]:

## 2021-12-01 ENCOUNTER — TRANSCRIPTION ENCOUNTER (OUTPATIENT)
Age: 76
End: 2021-12-01

## 2021-12-01 PROCEDURE — 99232 SBSQ HOSP IP/OBS MODERATE 35: CPT

## 2021-12-01 RX ORDER — ACETAMINOPHEN 500 MG
2 TABLET ORAL
Qty: 0 | Refills: 0 | DISCHARGE
Start: 2021-12-01

## 2021-12-01 RX ORDER — ASPIRIN/CALCIUM CARB/MAGNESIUM 324 MG
1 TABLET ORAL
Qty: 0 | Refills: 0 | DISCHARGE

## 2021-12-01 RX ORDER — TAMSULOSIN HYDROCHLORIDE 0.4 MG/1
1 CAPSULE ORAL
Qty: 0 | Refills: 0 | DISCHARGE

## 2021-12-01 RX ORDER — PHENAZOPYRIDINE HCL 100 MG
1 TABLET ORAL
Qty: 6 | Refills: 0
Start: 2021-12-01 | End: 2021-12-02

## 2021-12-01 RX ORDER — KETOROLAC TROMETHAMINE 30 MG/ML
15 SYRINGE (ML) INJECTION ONCE
Refills: 0 | Status: DISCONTINUED | OUTPATIENT
Start: 2021-12-01 | End: 2021-12-01

## 2021-12-01 RX ORDER — MOXIFLOXACIN HYDROCHLORIDE TABLETS, 400 MG 400 MG/1
1 TABLET, FILM COATED ORAL
Qty: 0 | Refills: 0 | DISCHARGE

## 2021-12-01 RX ORDER — PHENAZOPYRIDINE HCL 100 MG
1 TABLET ORAL
Qty: 0 | Refills: 0 | DISCHARGE

## 2021-12-01 RX ORDER — TAMSULOSIN HYDROCHLORIDE 0.4 MG/1
1 CAPSULE ORAL
Qty: 30 | Refills: 0
Start: 2021-12-01 | End: 2021-12-30

## 2021-12-01 RX ORDER — FINASTERIDE 5 MG/1
1 TABLET, FILM COATED ORAL
Qty: 30 | Refills: 0
Start: 2021-12-01 | End: 2021-12-30

## 2021-12-01 RX ORDER — MOXIFLOXACIN HYDROCHLORIDE TABLETS, 400 MG 400 MG/1
1 TABLET, FILM COATED ORAL
Qty: 8 | Refills: 0
Start: 2021-12-01 | End: 2021-12-04

## 2021-12-01 RX ADMIN — PIPERACILLIN AND TAZOBACTAM 25 GRAM(S): 4; .5 INJECTION, POWDER, LYOPHILIZED, FOR SOLUTION INTRAVENOUS at 12:01

## 2021-12-01 RX ADMIN — Medication 15 MILLIGRAM(S): at 03:10

## 2021-12-01 RX ADMIN — PIPERACILLIN AND TAZOBACTAM 25 GRAM(S): 4; .5 INJECTION, POWDER, LYOPHILIZED, FOR SOLUTION INTRAVENOUS at 21:37

## 2021-12-01 RX ADMIN — ENOXAPARIN SODIUM 40 MILLIGRAM(S): 100 INJECTION SUBCUTANEOUS at 21:37

## 2021-12-01 RX ADMIN — TAMSULOSIN HYDROCHLORIDE 0.4 MILLIGRAM(S): 0.4 CAPSULE ORAL at 21:36

## 2021-12-01 RX ADMIN — LIDOCAINE 1 APPLICATION(S): 4 CREAM TOPICAL at 04:53

## 2021-12-01 RX ADMIN — FINASTERIDE 5 MILLIGRAM(S): 5 TABLET, FILM COATED ORAL at 12:03

## 2021-12-01 RX ADMIN — Medication 15 MILLIGRAM(S): at 02:40

## 2021-12-01 RX ADMIN — PIPERACILLIN AND TAZOBACTAM 25 GRAM(S): 4; .5 INJECTION, POWDER, LYOPHILIZED, FOR SOLUTION INTRAVENOUS at 04:53

## 2021-12-01 NOTE — PROVIDER CONTACT NOTE (OTHER) - REASON
Pt c/o dysuria
patient was unaware of d/c planning and does not feel comfortable leaving as he was last told he would be seen by urology while in the hospital

## 2021-12-01 NOTE — PROVIDER CONTACT NOTE (OTHER) - ACTION/TREATMENT ORDERED:
Toradol one time dose
will hand off to primary team that patient feels unsafe with discharge to be followed up in AM

## 2021-12-01 NOTE — PROVIDER CONTACT NOTE (OTHER) - ASSESSMENT
patient was unaware of d/c planning and does not feel comfortable leaving as he was last told he would be seen by urology while in the hospital. Patient is still having extreme pain and difficulty only when urinating and is very unsure of going home in this condition is respectfully asking if his issues can be addressed prior to discharge
Pt is A&Ox4. Pt c/o dysuria at 8/10 pain feeling burning and sharp pains intermittently. No acute resp. distress noted. Afebrile. VS Stable.

## 2021-12-01 NOTE — DISCHARGE NOTE PROVIDER - NSDCMRMEDTOKEN_GEN_ALL_CORE_FT
Cipro 500 mg oral tablet: 1 tab(s) orally every 12 hours for 7 days dispensed on 11/27/2021  Ecotrin Adult Low Strength 81 mg oral delayed release tablet: 1 tab(s) orally once a day  phenazopyridine 200 mg oral tablet: 1 tab(s) orally 3 times a day (after meals), As Needed  tamsulosin 0.4 mg oral capsule: 1 cap(s) orally once a day   acetaminophen 325 mg oral tablet: 2 tab(s) orally every 6 hours, As needed, Temp greater or equal to 38C (100.4F), Mild Pain (1 - 3), Moderate Pain (4 - 6)  Cipro 500 mg oral tablet: 1 tab(s) orally every 12 hours   finasteride 5 mg oral tablet: 1 tab(s) orally once a day  phenazopyridine 200 mg oral tablet: 1 tab(s) orally every 8 hours, As needed, dysuria  tamsulosin 0.4 mg oral capsule: 1 cap(s) orally once a day (at bedtime)   acetaminophen 325 mg oral tablet: 2 tab(s) orally every 6 hours, As needed, Temp greater or equal to 38C (100.4F), Mild Pain (1 - 3), Moderate Pain (4 - 6)  belladonna-opium 16.2 mg-30 mg rectal suppository: 1 suppository(ies) rectally every 12 hours MDD:2 supp / day  Cipro 500 mg oral tablet: 1 tab(s) orally every 12 hours   finasteride 5 mg oral tablet: 1 tab(s) orally once a day  Motrin 400 mg oral tablet: 1 tab(s) orally every 8 hours, As Needed  phenazopyridine 200 mg oral tablet: 1 tab(s) orally every 8 hours, As needed, dysuria  tamsulosin 0.4 mg oral capsule: 2 cap(s) orally once a day (at bedtime)

## 2021-12-01 NOTE — DISCHARGE NOTE PROVIDER - CARE PROVIDER_API CALL
Morro Roldan; EVAN)  Urology  [address] [place] [address], [place] [address]  Phone: [phone]  Fax: [phone]  Follow Up Time: 1-3 days    MD Biju, PCP  Phone: (   )    -  Fax: (   )    -  Follow Up Time: 1 week    MD Biju,   PCP  Phone: (   )    -  Fax: (   )    -  Established Patient  Follow Up Time: 1 week   Morro Roldan; EVAN)  Urology  [address] [place] [address], [place] [address]  Phone: [phone]  Fax: [phone]  Follow Up Time: 1-3 days    MD Biju, PCP  Phone: (   )    -  Fax: (   )    -  Follow Up Time: 1 week    Alexsander Hatch)  Urology  [address] [place] [address], [place] [address]  Phone: [phone]  Fax: [phone]  Follow Up Time:

## 2021-12-01 NOTE — DISCHARGE NOTE PROVIDER - PROVIDER TOKENS
PROVIDER:[TOKEN:[[id number]],FOLLOWUP:[1-3 days]],FREE:[LAST:[MD Biju],FIRST:[PCP],PHONE:[(   )    -],FAX:[(   )    -],FOLLOWUP:[1 week]],FREE:[LAST:[MD Biju],PHONE:[(   )    -],FAX:[(   )    -],ADDRESS:[PCP],FOLLOWUP:[1 week],ESTABLISHEDPATIENT:[T]] PROVIDER:[TOKEN:[[id number]],FOLLOWUP:[1-3 days]],FREE:[LAST:[MD Biju],FIRST:[PCP],PHONE:[(   )    -],FAX:[(   )    -],FOLLOWUP:[1 week]],PROVIDER:[TOKEN:[[id number]]]

## 2021-12-01 NOTE — PROVIDER CONTACT NOTE (OTHER) - BACKGROUND
Pt admitted for UTI with newly diagnosed BPH and S/P duarte removal for TOV
patient admitted for BPH

## 2021-12-01 NOTE — PROVIDER CONTACT NOTE (OTHER) - RECOMMENDATIONS
patient is uncomfortable with discharge and wants to follow up in AM as he does not feel discharge is safe
Toradol as pt endorses no relief with Tylenol

## 2021-12-01 NOTE — DISCHARGE NOTE PROVIDER - CARE PROVIDERS DIRECT ADDRESSES
[email or web address],DirectAddress_Unknown,DirectAddress_Unknown ,[email or web address]

## 2021-12-01 NOTE — PROVIDER CONTACT NOTE (OTHER) - SITUATION
patient was unaware of d/c planning and does not feel comfortable leaving as he was last told he would be seen by urology while in the hospital
Pt c/o dysuria

## 2021-12-01 NOTE — DISCHARGE NOTE PROVIDER - NSDCCPCAREPLAN_GEN_ALL_CORE_FT
PRINCIPAL DISCHARGE DIAGNOSIS  Diagnosis: UTI (urinary tract infection)  Assessment and Plan of Treatment: Continue antibiotics as directed and monitor for signs/symptoms of infection, such as, fever/chills, burning/pain with urination, urinary frequency/hesitancy, cloudy urine, or blood in urine.      SECONDARY DISCHARGE DIAGNOSES  Diagnosis: HTN (hypertension)  Assessment and Plan of Treatment: Continue blood pressure medication regimen as directed. Monitor for any visual changes, headaches or dizziness.  Monitor blood pressure regularly.  Follow up with your PCP for further management for high blood pressure.    Diagnosis: BPH (benign prostatic hyperplasia)  Assessment and Plan of Treatment: Continue Tamsulosin and Finasteride. Outpatient Urology follow up on discharge.     PRINCIPAL DISCHARGE DIAGNOSIS  Diagnosis: UTI (urinary tract infection)  Assessment and Plan of Treatment: Continue antibiotics Cipro as directed for two more days and monitor for signs/symptoms of infection, such as, fever/chills, burning/pain with urination, urinary frequency/hesitancy, cloudy urine, or blood in urine. Continue taking Motrin as needed on full stomach and follow up with [facility] for further eval of BPH and voiding symptoms [address] [place] [address], [place] [address]  Phone: [phone]      SECONDARY DISCHARGE DIAGNOSES  Diagnosis: BPH (benign prostatic hyperplasia)  Assessment and Plan of Treatment: Continue Tamsulosin dose increased to 2 caps 0.8mg daily and Finasteride as prescribed. Outpatient Urology follow up on discharge with [address], [place] [address], Phone: [phone]    Diagnosis: HTN (hypertension)  Assessment and Plan of Treatment: Continue blood pressure medication regimen as directed. Monitor for any visual changes, headaches or dizziness.  Monitor blood pressure regularly.  Follow up with your PCP for further management for high blood pressure.    Diagnosis: Adrenal mass  Assessment and Plan of Treatment: Incidentally found on CT A/P. Please follow up as outpatient for further evaluation with adrenal mass protocol

## 2021-12-01 NOTE — DISCHARGE NOTE PROVIDER - HOSPITAL COURSE
A 74 yo male PMHx CAD, s/p CABG x3, HTN and recently diagnosed BPH (w/ duarte in place) p/w dysuria x 5 days, now accompanied by fever and chills admitted to medicine for Sepsis secondary to UTI/ureteropyelitis in a setting of BPH.    Sepsis due to urinary tract infection.   - CT findings c/w Ureteropyelitis, ?prostatitis.   - Continue IV Zosyn 3.375 Q8h for pseudomonal coverage , recent duarte.   - Transitioned to Ciprofloxacin BID for total 7 days.   - Tamsulosin/Finasteride for BPH.   - Tylenol PRN pain and Fever  - Pyridium for dysuria PRN not to exceed 3 days.   - Blood cultures, urine cultures NGTD.   - COVID negative.    BPH (benign prostatic hyperplasia).   - Continue Tamsulosin 0.4mg qhs and started on Finasteride 5mg daily  - Passed TOV overnight.   - Outpatient Urology follow up on discharge.    CAD (coronary artery disease).   - c/w ASA.    HTN (hypertension).   - Currently normotensive  - Monitor BP daily  - DASH diet.    Adrenal mass.   - Incidentally found on CT A/P.   - Oupatient evaluation with adrenal mass protocol.    Need for prophylactic measure.   Lovenox 40mg SC Q24h    Patient is clinically and hemodynamically stable for discharge today.   DC discussed with patient, team, attending.   DC planning time: 34 min in coordinating DC plan with patient/team.   Outpatient follow up with PCP in 1 week post DC.   Outpatient follow up with Urology in 1 week post DC.  A 76 yo male PMHx CAD, s/p CABG x3, HTN and recently diagnosed BPH (w/ duarte in place) p/w dysuria x 5 days, now accompanied by fever and chills admitted to medicine for Sepsis secondary to UTI/ureteropyelitis in a setting of BPH.    Sepsis due to urinary tract infection. Met sepsis criteria on admission.   - CT findings c/w Ureteropyelitis, ?prostatitis.   - Continue IV Zosyn 3.375 Q8h for pseudomonal coverage , recent duarte.   - Transitioned to Ciprofloxacin BID for total 7 days.   - Tamsulosin/Finasteride for BPH.   - Tylenol PRN pain and Fever  - Pyridium for dysuria PRN not to exceed 3 days.   - Blood cultures, urine cultures NGTD.   - COVID negative.    BPH (benign prostatic hyperplasia).   - Continue Tamsulosin 0.4mg qhs and started on Finasteride 5mg daily  - Passed TOV overnight.   - Outpatient Urology follow up on discharge.    CAD (coronary artery disease).   - c/w ASA.    HTN (hypertension).   - Currently normotensive  - Monitor BP daily  - DASH diet.    Adrenal mass.   - Incidentally found on CT A/P.   - Oupatient evaluation with adrenal mass protocol.    Need for prophylactic measure.   Lovenox 40mg SC Q24h    Patient is clinically and hemodynamically stable for discharge today.   DC discussed with patient, team, attending.   DC planning time: 34 min in coordinating DC plan with patient/team.   Outpatient follow up with PCP in 1 week post DC.   Outpatient follow up with Urology in 1 week post DC.  A 74 yo male PMHx CAD, s/p CABG x3, HTN and recently diagnosed BPH (w/ duarte in place) p/w dysuria x 5 days, now accompanied by fever and chills admitted to medicine for Sepsis secondary to UTI/ureteropyelitis in a setting of BPH.    Sepsis due to urinary tract infection. Met sepsis criteria on admission.   - CT findings c/w Ureteropyelitis, ?prostatitis.   - Continue IV Zosyn 3.375 Q8h for pseudomonal coverage , recent duarte.   - Transitioned to Ciprofloxacin BID for total 7 days.   - Tamsulosin/Finasteride for BPH.   - Tylenol PRN pain and Fever  - Pyridium for dysuria PRN not to exceed 3 days.   - Blood cultures, urine cultures NGTD.   - COVID negative.    BPH (benign prostatic hyperplasia).   - Continue Tamsulosin 0.4mg qhs and started on Finasteride 5mg daily  - Passed TOV overnight.   - Outpatient Urology follow up on discharge.    CAD (coronary artery disease).   - c/w ASA.    HTN (hypertension).   - Currently normotensive  - Monitor BP daily  - DASH diet.    Adrenal mass.   - Incidentally found on CT A/P.   - Oupatient evaluation with adrenal mass protocol.    Need for prophylactic measure.   Lovenox 40mg SC Q24h    Patient is clinically and hemodynamically stable for discharge today.   DC discussed with patient, team, attending.   DC planning time: 34 min in coordinating DC plan with patient/team.   Outpatient follow up with PCP in 1 week post DC.   Outpatient follow up with Urology in 1 week post DC.  A 76 yo male PMHx CAD, s/p CABG x3, HTN and recently diagnosed BPH (w/ duarte in place) p/w dysuria x 5 days, now accompanied by fever and chills admitted to medicine for Sepsis secondary to UTI/ureteropyelitis in a setting of BPH    Urosepsis- UA- pos, BCx- NTD, UCx- negative, CXR- clear lungs  - CT Abd+Pelvis w/IV con: Acute cystitis. Prostatomegaly with surrounding inflammation, which may be on the basis of prostatitis versus recent instrumentation. No abscess, free air or free fluid. 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 Indeterminate 2.7 cm left adrenal gland mass. Urinary Duarte catheter in place. Pt was treated on IV Zosyn 3.375 Q8h, can d/c on Cipro po x7 days    BPH- Continue Tamsulosin 0.4mg qhs and started on Finasteride 5mg daily, 11/29 Duarte d/c passed TOV  - Penile pain- -Uro rec: C/w Flomax, would increase to 0.8mg qd, Would recommend empiric trial of Flomax 0.8mg and ibuprofen as needed for prostatitis, f/u with [facility] for further eval of BPH and voiding symptoms    CAD- c/w ASA    HTN- Currently normotensive. Monitor BP daily    Adrenal mass- Incidentally found on CT A/P  - O/p evaluation with adrenal mass protocol    Need for prophylactic measure- Lovenox 40mg SC Q24h      Patient is clinically and hemodynamically stable for discharge today.   DC planning time: 34 min in coordinating DC plan with patient/team.   Outpatient follow up with PCP in 1 week post DC.   Outpatient follow up with Urology in 1 week post DC.  A 74 yo male PMHx CAD, s/p CABG x3, HTN and recently diagnosed BPH (w/ duarte in place) p/w dysuria x 5 days, now accompanied by fever and chills admitted to medicine for Sepsis secondary to UTI/ureteropyelitis in a setting of BPH    Urosepsis- UA- pos, BCx- NTD, UCx- negative, CXR- clear lungs  - CT Abd+Pelvis w/IV con: Acute cystitis. Prostatomegaly with surrounding inflammation, which may be on the basis of prostatitis versus recent instrumentation. No abscess, free air or free fluid. 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 Indeterminate 2.7 cm left adrenal gland mass. Urinary Duarte catheter in place. Pt was treated on IV Zosyn 3.375 Q8h, can d/c on Cipro po x7 days    BPH- Continue Tamsulosin 0.4mg qhs and started on Finasteride 5mg daily, 11/29 Duarte d/c passed TOV  - Penile pain- -Uro rec: C/w Flomax, increased to 0.8mg qd, Would recommend empiric trial of Flomax 0.8mg and ibuprofen as needed for prostatitis, f/u with [facility] for further eval of BPH and voiding symptoms    CAD- c/w ASA    HTN- Currently normotensive. Monitor BP daily    Adrenal mass- Incidentally found on CT A/P. O/p evaluation with adrenal mass protocol    Need for prophylactic measure- Lovenox 40mg SC Q24h      Patient is clinically and hemodynamically stable for discharge today.   DC planning time: 44 min in coordinating DC plan with patient/team.   Outpatient follow up with PCP in 1 week post DC.   Outpatient follow up with Urology in 1 week post DC.

## 2021-12-01 NOTE — PROGRESS NOTE ADULT - SUBJECTIVE AND OBJECTIVE BOX
PROGRESS NOTE:     Patient is a 76y old  Male who presents with a chief complaint of Urosepsis (01 Dec 2021 14:31)    SUBJECTIVE / OVERNIGHT EVENTS: Patient seen and evaluated at bedside. NO acute distress evident, no overnight events. Reports no sob, chest pain, abdominal pain, nausea, vomiting. Periodically gets pain episode when urinating, short lived , self resolves.     ADDITIONAL REVIEW OF SYSTEMS:    MEDICATIONS  (STANDING):  enoxaparin Injectable 40 milliGRAM(s) SubCutaneous every 24 hours  finasteride 5 milliGRAM(s) Oral daily  influenza  Vaccine (HIGH DOSE) 0.7 milliLiter(s) IntraMuscular once  lidocaine 2% Gel 1 Application(s) Topical daily  piperacillin/tazobactam IVPB.. 3.375 Gram(s) IV Intermittent every 8 hours  tamsulosin 0.4 milliGRAM(s) Oral at bedtime    MEDICATIONS  (PRN):  acetaminophen     Tablet .. 650 milliGRAM(s) Oral every 6 hours PRN Temp greater or equal to 38C (100.4F), Mild Pain (1 - 3), Moderate Pain (4 - 6)  phenazopyridine 200 milliGRAM(s) Oral every 8 hours PRN dysuria    CAPILLARY BLOOD GLUCOSE    I&O's Summary    30 Nov 2021 07:01  -  01 Dec 2021 07:00  --------------------------------------------------------  IN: 0 mL / OUT: 900 mL / NET: -900 mL    01 Dec 2021 07:01  -  01 Dec 2021 14:42  --------------------------------------------------------  IN: 0 mL / OUT: 750 mL / NET: -750 mL    PHYSICAL EXAM:  Vital Signs Last 24 Hrs  T(C): 37.1 (01 Dec 2021 12:00), Max: 37.3 (30 Nov 2021 21:53)  T(F): 98.7 (01 Dec 2021 12:00), Max: 99.1 (30 Nov 2021 21:53)  HR: 62 (01 Dec 2021 12:00) (60 - 70)  BP: 136/62 (01 Dec 2021 12:00) (129/62 - 155/67)  BP(mean): --  RR: 17 (01 Dec 2021 12:00) (17 - 18)  SpO2: 98% (01 Dec 2021 12:00) (98% - 100%)    CONSTITUTIONAL: NAD, well-developed, elderly, comfortable appearing.   RESPIRATORY: Normal respiratory effort; lungs are clear to auscultation bilaterally  CARDIOVASCULAR: Regular rate and rhythm, normal S1 and S2  No lower extremity edema; Peripheral pulses are 2+ bilaterally  ABDOMEN: Nontender to palpation, normoactive bowel sounds  MUSCLOSKELETAL: no clubbing or cyanosis of digits; no joint swelling or tenderness to palpation  PSYCH: A+O to person, place, and time; affect appropriate    LABS: reviewed.                         10.8   8.59  )-----------( 177      ( 30 Nov 2021 06:50 )             32.0     11-30    138  |  104  |  13  ----------------------------<  92  3.6   |  25  |  1.19    Ca    9.2      30 Nov 2021 06:50  Phos  3.2     11-30  Mg     2.20     11-30    Culture - Blood (collected 28 Nov 2021 18:29)  Source: .Blood Blood-Peripheral  Preliminary Report (29 Nov 2021 19:01):    No growth to date.    Culture - Blood (collected 28 Nov 2021 18:29)  Source: .Blood Blood-Peripheral  Preliminary Report (29 Nov 2021 19:01):    No growth to date.    RADIOLOGY & ADDITIONAL TESTS:  Results Reviewed:   Imaging Personally Reviewed:  Electrocardiogram Personally Reviewed:    COORDINATION OF CARE:  Care Discussed with Consultants/Other Providers [Y/N]:  Prior or Outpatient Records Reviewed [Y/N]:   PROGRESS NOTE:     Patient is a 76y old  Male who presents with a chief complaint of Urosepsis (01 Dec 2021 14:31)    SUBJECTIVE / OVERNIGHT EVENTS: Patient seen and evaluated at bedside. NO acute distress evident, no overnight events. Reports no sob, chest pain, abdominal pain, nausea, vomiting. Periodically gets pain episode when urinating, short lived , self resolves.     ADDITIONAL REVIEW OF SYSTEMS:    MEDICATIONS  (STANDING):  enoxaparin Injectable 40 milliGRAM(s) SubCutaneous every 24 hour  finasteride 5 milliGRAM(s) Oral daily  influenza  Vaccine (HIGH DOSE) 0.7 milliLiter(s) IntraMuscular once  lidocaine 2% Gel 1 Application(s) Topical daily  piperacillin/tazobactam IVPB.. 3.375 Gram(s) IV Intermittent every 8 hours  tamsulosin 0.4 milliGRAM(s) Oral at bedtime    MEDICATIONS  (PRN):  acetaminophen     Tablet .. 650 milliGRAM(s) Oral every 6 hours PRN Temp greater or equal to 38C (100.4F), Mild Pain (1 - 3), Moderate Pain (4 - 6)  phenazopyridine 200 milliGRAM(s) Oral every 8 hours PRN dysuria    CAPILLARY BLOOD GLUCOSE    I&O's Summary    30 Nov 2021 07:01  -  01 Dec 2021 07:00  --------------------------------------------------------  IN: 0 mL / OUT: 900 mL / NET: -900 mL    01 Dec 2021 07:01  -  01 Dec 2021 14:42  --------------------------------------------------------  IN: 0 mL / OUT: 750 mL / NET: -750 mL    PHYSICAL EXAM:  Vital Signs Last 24 Hrs  T(C): 37.1 (01 Dec 2021 12:00), Max: 37.3 (30 Nov 2021 21:53)  T(F): 98.7 (01 Dec 2021 12:00), Max: 99.1 (30 Nov 2021 21:53)  HR: 62 (01 Dec 2021 12:00) (60 - 70)  BP: 136/62 (01 Dec 2021 12:00) (129/62 - 155/67)  BP(mean): --  RR: 17 (01 Dec 2021 12:00) (17 - 18)  SpO2: 98% (01 Dec 2021 12:00) (98% - 100%)    CONSTITUTIONAL: NAD, well-developed, elderly, comfortable appearing.   RESPIRATORY: Normal respiratory effort; lungs are clear to auscultation bilaterally  CARDIOVASCULAR: Regular rate and rhythm, normal S1 and S2  No lower extremity edema; Peripheral pulses are 2+ bilaterally  ABDOMEN: Nontender to palpation, normoactive bowel sounds  MUSCLOSKELETAL: no clubbing or cyanosis of digits; no joint swelling or tenderness to palpation  PSYCH: A+O to person, place, and time; affect appropriate    LABS: reviewed.                         10.8   8.59  )-----------( 177      ( 30 Nov 2021 06:50 )             32.0     11-30    138  |  104  |  13  ----------------------------<  92  3.6   |  25  |  1.19    Ca    9.2      30 Nov 2021 06:50  Phos  3.2     11-30  Mg     2.20     11-30    Culture - Blood (collected 28 Nov 2021 18:29)  Source: .Blood Blood-Peripheral  Preliminary Report (29 Nov 2021 19:01):    No growth to date.    Culture - Blood (collected 28 Nov 2021 18:29)  Source: .Blood Blood-Peripheral  Preliminary Report (29 Nov 2021 19:01):    No growth to date.    RADIOLOGY & ADDITIONAL TESTS:  Results Reviewed:   Imaging Personally Reviewed:  Electrocardiogram Personally Reviewed:    COORDINATION OF CARE:  Care Discussed with Consultants/Other Providers [Y/N]:  Prior or Outpatient Records Reviewed [Y/N]:

## 2021-12-02 LAB
ANION GAP SERPL CALC-SCNC: 9 MMOL/L — SIGNIFICANT CHANGE UP (ref 7–14)
BUN SERPL-MCNC: 9 MG/DL — SIGNIFICANT CHANGE UP (ref 7–23)
CALCIUM SERPL-MCNC: 9.2 MG/DL — SIGNIFICANT CHANGE UP (ref 8.4–10.5)
CHLORIDE SERPL-SCNC: 107 MMOL/L — SIGNIFICANT CHANGE UP (ref 98–107)
CO2 SERPL-SCNC: 23 MMOL/L — SIGNIFICANT CHANGE UP (ref 22–31)
CREAT SERPL-MCNC: 1.09 MG/DL — SIGNIFICANT CHANGE UP (ref 0.5–1.3)
GLUCOSE SERPL-MCNC: 101 MG/DL — HIGH (ref 70–99)
HCT VFR BLD CALC: 30.9 % — LOW (ref 39–50)
HGB BLD-MCNC: 10.6 G/DL — LOW (ref 13–17)
MAGNESIUM SERPL-MCNC: 2.1 MG/DL — SIGNIFICANT CHANGE UP (ref 1.6–2.6)
MCHC RBC-ENTMCNC: 33.3 PG — SIGNIFICANT CHANGE UP (ref 27–34)
MCHC RBC-ENTMCNC: 34.3 GM/DL — SIGNIFICANT CHANGE UP (ref 32–36)
MCV RBC AUTO: 97.2 FL — SIGNIFICANT CHANGE UP (ref 80–100)
NRBC # BLD: 0 /100 WBCS — SIGNIFICANT CHANGE UP
NRBC # FLD: 0 K/UL — SIGNIFICANT CHANGE UP
PHOSPHATE SERPL-MCNC: 2.5 MG/DL — SIGNIFICANT CHANGE UP (ref 2.5–4.5)
PLATELET # BLD AUTO: 236 K/UL — SIGNIFICANT CHANGE UP (ref 150–400)
POTASSIUM SERPL-MCNC: 4.5 MMOL/L — SIGNIFICANT CHANGE UP (ref 3.5–5.3)
POTASSIUM SERPL-SCNC: 4.5 MMOL/L — SIGNIFICANT CHANGE UP (ref 3.5–5.3)
RBC # BLD: 3.18 M/UL — LOW (ref 4.2–5.8)
RBC # FLD: 11.9 % — SIGNIFICANT CHANGE UP (ref 10.3–14.5)
SODIUM SERPL-SCNC: 139 MMOL/L — SIGNIFICANT CHANGE UP (ref 135–145)
WBC # BLD: 6.51 K/UL — SIGNIFICANT CHANGE UP (ref 3.8–10.5)
WBC # FLD AUTO: 6.51 K/UL — SIGNIFICANT CHANGE UP (ref 3.8–10.5)

## 2021-12-02 PROCEDURE — 99232 SBSQ HOSP IP/OBS MODERATE 35: CPT

## 2021-12-02 RX ORDER — POLYETHYLENE GLYCOL 3350 17 G/17G
17 POWDER, FOR SOLUTION ORAL ONCE
Refills: 0 | Status: COMPLETED | OUTPATIENT
Start: 2021-12-02 | End: 2021-12-02

## 2021-12-02 RX ORDER — ATROPA BELLADONNA AND OPIUM 16.2; 6 MG/1; MG/1
1 SUPPOSITORY RECTAL
Refills: 0 | Status: COMPLETED | OUTPATIENT
Start: 2021-12-02 | End: 2021-12-03

## 2021-12-02 RX ORDER — PHENAZOPYRIDINE HCL 100 MG
200 TABLET ORAL EVERY 8 HOURS
Refills: 0 | Status: COMPLETED | OUTPATIENT
Start: 2021-12-02 | End: 2021-12-03

## 2021-12-02 RX ADMIN — PIPERACILLIN AND TAZOBACTAM 25 GRAM(S): 4; .5 INJECTION, POWDER, LYOPHILIZED, FOR SOLUTION INTRAVENOUS at 19:49

## 2021-12-02 RX ADMIN — LIDOCAINE 1 APPLICATION(S): 4 CREAM TOPICAL at 04:39

## 2021-12-02 RX ADMIN — ATROPA BELLADONNA AND OPIUM 1 SUPPOSITORY(S): 16.2; 6 SUPPOSITORY RECTAL at 21:18

## 2021-12-02 RX ADMIN — Medication 200 MILLIGRAM(S): at 21:18

## 2021-12-02 RX ADMIN — ENOXAPARIN SODIUM 40 MILLIGRAM(S): 100 INJECTION SUBCUTANEOUS at 21:18

## 2021-12-02 RX ADMIN — ATROPA BELLADONNA AND OPIUM 1 SUPPOSITORY(S): 16.2; 6 SUPPOSITORY RECTAL at 11:45

## 2021-12-02 RX ADMIN — Medication 200 MILLIGRAM(S): at 11:43

## 2021-12-02 RX ADMIN — TAMSULOSIN HYDROCHLORIDE 0.4 MILLIGRAM(S): 0.4 CAPSULE ORAL at 21:18

## 2021-12-02 RX ADMIN — ATROPA BELLADONNA AND OPIUM 1 SUPPOSITORY(S): 16.2; 6 SUPPOSITORY RECTAL at 19:41

## 2021-12-02 RX ADMIN — FINASTERIDE 5 MILLIGRAM(S): 5 TABLET, FILM COATED ORAL at 11:45

## 2021-12-02 RX ADMIN — PIPERACILLIN AND TAZOBACTAM 25 GRAM(S): 4; .5 INJECTION, POWDER, LYOPHILIZED, FOR SOLUTION INTRAVENOUS at 04:38

## 2021-12-02 RX ADMIN — PIPERACILLIN AND TAZOBACTAM 25 GRAM(S): 4; .5 INJECTION, POWDER, LYOPHILIZED, FOR SOLUTION INTRAVENOUS at 11:46

## 2021-12-02 NOTE — CONSULT NOTE ADULT - SUBJECTIVE AND OBJECTIVE BOX
HPI:  74 yo male PMHx CAD, s/p CABG x3, HTN and recently diagnosed BPH (w/ duarte in place) p/w dysuria x 5 days. Last Wednesday pt developed dysuria with burning on urination. Pt described it as "shards of glass." Last week went to [facility] where a Duarte was placed at the ED there. After returning home, started to experience severe penile pain w/ urination. Presented to [facility] ED with Duarte in place. Said to have UTI/sepsis and admitted to medical service. Urology asked for further eval.     He has a hx of BPH, previously saw Dr. Rogers at [facility] but has not seen him in some time. In past has had difficulty urinating w/ urinary frequency during the day and night. Currently most bothersome is severe pain w/ urination. Describes it as shards of glass passing. No hematuria. No personal/family hx of prostate cancer. Has been on Flomax intermittently in past. No prior hx of pelvic surgery/radiation. No other major  hx beyond the BPH. Previously had Duarte in however, now removed. PVR 72cc.      (28 Nov 2021 17:11)      PAST MEDICAL & SURGICAL HISTORY:  CAD (coronary artery disease)    HTN (hypertension)    BPH (benign prostatic hyperplasia)    Atherosclerosis of coronary artery bypass graft(s), unspecified, with angina pectoris with documented spasm  CABG x 2012        MEDICATIONS  (STANDING):  belladonna 16.2 mG/opium 30 mg Suppository 1 Suppository(s) Rectal two times a day  enoxaparin Injectable 40 milliGRAM(s) SubCutaneous every 24 hours  finasteride 5 milliGRAM(s) Oral daily  influenza  Vaccine (HIGH DOSE) 0.7 milliLiter(s) IntraMuscular once  lidocaine 2% Gel 1 Application(s) Topical daily  phenazopyridine 200 milliGRAM(s) Oral every 8 hours  piperacillin/tazobactam IVPB.. 3.375 Gram(s) IV Intermittent every 8 hours  tamsulosin 0.4 milliGRAM(s) Oral at bedtime    MEDICATIONS  (PRN):  acetaminophen     Tablet .. 650 milliGRAM(s) Oral every 6 hours PRN Temp greater or equal to 38C (100.4F), Mild Pain (1 - 3), Moderate Pain (4 - 6)      FAMILY HISTORY:  Family history of acute myocardial infarction (Mother)        Allergies    No Known Allergies    Intolerances        SOCIAL HISTORY:    REVIEW OF SYSTEMS: Otherwise negative as stated in HPI    Physical Exam  Vital signs  T(C): 37.1 (12-02-21 @ 12:37), Max: 37.4 (12-01-21 @ 20:30)  HR: 66 (12-02-21 @ 12:37)  BP: 144/62 (12-02-21 @ 12:37)  SpO2: 99% (12-02-21 @ 12:37)  Wt(kg): --    Output    OUT:    Voided (mL): 750 mL  Total OUT: 750 mL    Total NET: -750 mL      OUT:    Voided (mL): 650 mL  Total OUT: 650 mL    Total NET: -650 mL          Gen: awake and alert. NAD.   Pulm: Normal work of breathing on RA  : tender enlarged prostate. not boggy.   Psych:  AOx3        LABS:      12-02 @ 06:36    WBC 6.51  / Hct 30.9  / SCr 1.09     12-02    139  |  107  |  9   ----------------------------<  101<H>  4.5   |  23  |  1.09    Ca    9.2      02 Dec 2021 06:36  Phos  2.5     12-02  Mg     2.10     12-02            Urine Cx:   Culture - Urine (11.28.21 @ 13:45)   Specimen Source: Clean Catch Clean Catch (Midstream)   Culture Results:   No growth    RADIOLOGY:  < from: CT Abdomen and Pelvis w/ IV Cont (11.28.21 @ 14:28) >    EXAM:  CT ABDOMEN AND PELVIS IC        PROCEDURE DATE:  Nov 28 2021         INTERPRETATION:  CLINICAL INFORMATION: BPH with dysuria and pelvic pain, status post Duarte catheter placement for urinary retention.    COMPARISON: None.    CONTRAST/COMPLICATIONS:  IV Contrast: Omnipaque 350  90 cc administered   10 cc discarded  Oral Contrast: NONE  Complications: None reported at time of study completion    PROCEDURE:  CT of the Abdomen and Pelvis was performed.  Sagittal and coronal reformats were performed.    FINDINGS:  LOWER CHEST: Within normal limits.    LIVER: Within normal limits.  BILE DUCTS: Normal caliber.  GALLBLADDER: Within normal limits.  SPLEEN: Within normal limits.  PANCREAS: Within normal limits.  ADRENALS: 2.7 cm left adrenal gland mass with focal calcification. Nodular thickening of the right adrenal gland without discrete nodule.  KIDNEYS/URETERS: Left renal cysts, including a 5.2 cm exophytic cyst in the upper pole. No renal mass or hydronephrosis. 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    BLADDER: Mild bladder wall thickening with perivesical fat infiltration. A urinary Duarte catheter in place with associated locules of anti--dependent gas within the bladder, likely secondary to underlying Duarte.  REPRODUCTIVE ORGANS: Prostate gland is enlarged with surrounding infiltration of the prostate gland and seminal vesicles. No intraprostatic abscess.    BOWEL: Scattered colonic diverticulosis. No bowel obstruction or inflammation. Appendix is not visualized.  PERITONEUM: No ascites or free air.  VESSELS: Atherosclerotic changes. A stent within the left common iliac vein with nonspecific peripheral internal calcification noted.  RETROPERITONEUM/LYMPH NODES: No lymphadenopathy.  ABDOMINAL WALL: Tiny fat-containing inguinal hernias bilaterally.  BONES: Status post sternotomy. Mild degenerative changes of the spine.    IMPRESSION:    Acute cystitis. Prostatomegaly with surrounding inflammation, which may be on the basis of prostatitisversus recent instrumentation. No abscess, free air or free fluid.    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    Indeterminate 2.7 cm left adrenal gland mass. Further evaluation with nonemergent MRI or CT (adrenal mass protocol) is suggested for further characterization.    Urinary Duarte catheter in place.    --- End of Report ---            WESLEY VACA MD; Fellow Radiology  This document has been electronically signed.  AURA BEAULIEU MD; Attending Radiologist  This document has been electronically signed. Nov 28 2021  3:18PM    < end of copied text >

## 2021-12-02 NOTE — CONSULT NOTE ADULT - ASSESSMENT
76M hx of BPH. Urology evaluating for penile pain/dysuria. Symptoms likely 2/2 BPH and prostatitis.     --Labs reviewed. UCx negative.   --CT reviewed. Enlarged prostate ~100cc gland.   --Given clean urine culture, would suspect inflammatory/noninfectious prostatitis.   --C/w Flomax, would increase to 0.8mg qd  --C/w finasteride  --Would recommend empiric trial of Flomax 0.8mg and ibuprofen x6wks for prostatitis.   --Can dispo per medicine. Would recommend f/u with [facility] for further eval of BPH and voiding symptoms  [facility]    [address] [place] [address], [place] [address]  Phone: [phone]     d/w Dr Valadez

## 2021-12-02 NOTE — PROGRESS NOTE ADULT - SUBJECTIVE AND OBJECTIVE BOX
PROGRESS NOTE:     Patient is a 76y old  Male who presents with a chief complaint of Urosepsis (01 Dec 2021 14:41)    SUBJECTIVE / OVERNIGHT EVENTS: Patient seen and evaluated at bedside. NO acute distress evidnet, overnight reports periodic episodes of severe spasmodic penile pain that lasts 30 seconds to 1-2 minutes, self resolves without any medications. Patient expresses his concern as this is the same pain he had prior to admission.  Reports no sob, chest pain, abd pain, nausea, vomiting, dizziness, palpitations or further complaints.     ADDITIONAL REVIEW OF SYSTEMS:    MEDICATIONS  (STANDING):  belladonna 16.2 mG/opium 30 mg Suppository 1 Suppository(s) Rectal two times a day  enoxaparin Injectable 40 milliGRAM(s) SubCutaneous every 24 hours  finasteride 5 milliGRAM(s) Oral daily  influenza  Vaccine (HIGH DOSE) 0.7 milliLiter(s) IntraMuscular once  lidocaine 2% Gel 1 Application(s) Topical daily  phenazopyridine 200 milliGRAM(s) Oral every 8 hours  piperacillin/tazobactam IVPB.. 3.375 Gram(s) IV Intermittent every 8 hours  tamsulosin 0.4 milliGRAM(s) Oral at bedtime    MEDICATIONS  (PRN):  acetaminophen     Tablet .. 650 milliGRAM(s) Oral every 6 hours PRN Temp greater or equal to 38C (100.4F), Mild Pain (1 - 3), Moderate Pain (4 - 6)    CAPILLARY BLOOD GLUCOSE    I&O's Summary    01 Dec 2021 07:01  -  02 Dec 2021 07:00  --------------------------------------------------------  IN: 0 mL / OUT: 750 mL / NET: -750 mL    02 Dec 2021 07:01  -  02 Dec 2021 14:29  --------------------------------------------------------  IN: 0 mL / OUT: 650 mL / NET: -650 mL    PHYSICAL EXAM:  Vital Signs Last 24 Hrs  T(C): 36.9 (02 Dec 2021 04:46), Max: 37.4 (01 Dec 2021 20:30)  T(F): 98.5 (02 Dec 2021 04:46), Max: 99.4 (01 Dec 2021 20:30)  HR: 63 (02 Dec 2021 04:46) (63 - 66)  BP: 138/59 (02 Dec 2021 04:46) (138/59 - 144/55)  BP(mean): --  RR: 18 (02 Dec 2021 04:46) (18 - 18)  SpO2: 98% (02 Dec 2021 04:46) (98% - 99%)    CONSTITUTIONAL: NAD, well-developed, comfortable.   RESPIRATORY: Normal respiratory effort; lungs are clear to auscultation bilaterally  CARDIOVASCULAR: Regular rate and rhythm, normal S1 and S2  No lower extremity edema; Peripheral pulses are 2+ bilaterally  ABDOMEN: Nontender to palpation, normoactive bowel sounds.  MUSCLOSKELETAL: no clubbing or cyanosis of digits; no joint swelling or tenderness to palpation  PSYCH: A+O to person, place, and time; affect appropriate    LABS: reviewed                         10.6   6.51  )-----------( 236      ( 02 Dec 2021 06:36 )             30.9     12-02    139  |  107  |  9   ----------------------------<  101<H>  4.5   |  23  |  1.09    Ca    9.2      02 Dec 2021 06:36  Phos  2.5     12-02  Mg     2.10     12-02    RADIOLOGY & ADDITIONAL TESTS:  Results Reviewed:   Imaging Personally Reviewed:  Electrocardiogram Personally Reviewed:    COORDINATION OF CARE:  Care Discussed with Consultants/Other Providers [Y/N]:  Prior or Outpatient Records Reviewed [Y/N]:

## 2021-12-03 ENCOUNTER — TRANSCRIPTION ENCOUNTER (OUTPATIENT)
Age: 76
End: 2021-12-03

## 2021-12-03 VITALS
RESPIRATION RATE: 18 BRPM | OXYGEN SATURATION: 96 % | TEMPERATURE: 98 F | SYSTOLIC BLOOD PRESSURE: 129 MMHG | HEART RATE: 63 BPM | DIASTOLIC BLOOD PRESSURE: 62 MMHG

## 2021-12-03 LAB
CULTURE RESULTS: SIGNIFICANT CHANGE UP
CULTURE RESULTS: SIGNIFICANT CHANGE UP
SPECIMEN SOURCE: SIGNIFICANT CHANGE UP
SPECIMEN SOURCE: SIGNIFICANT CHANGE UP

## 2021-12-03 PROCEDURE — 99239 HOSP IP/OBS DSCHRG MGMT >30: CPT

## 2021-12-03 RX ORDER — TAMSULOSIN HYDROCHLORIDE 0.4 MG/1
2 CAPSULE ORAL
Qty: 60 | Refills: 0
Start: 2021-12-03 | End: 2022-01-01

## 2021-12-03 RX ORDER — ATROPA BELLADONNA AND OPIUM 16.2; 6 MG/1; MG/1
1 SUPPOSITORY RECTAL
Qty: 4 | Refills: 0
Start: 2021-12-03 | End: 2021-12-04

## 2021-12-03 RX ORDER — MOXIFLOXACIN HYDROCHLORIDE TABLETS, 400 MG 400 MG/1
1 TABLET, FILM COATED ORAL
Qty: 4 | Refills: 0
Start: 2021-12-03 | End: 2021-12-04

## 2021-12-03 RX ORDER — TAMSULOSIN HYDROCHLORIDE 0.4 MG/1
0.8 CAPSULE ORAL AT BEDTIME
Refills: 0 | Status: DISCONTINUED | OUTPATIENT
Start: 2021-12-03 | End: 2021-12-03

## 2021-12-03 RX ORDER — PHENAZOPYRIDINE HCL 100 MG
1 TABLET ORAL
Qty: 6 | Refills: 0
Start: 2021-12-03 | End: 2021-12-04

## 2021-12-03 RX ORDER — FINASTERIDE 5 MG/1
1 TABLET, FILM COATED ORAL
Qty: 30 | Refills: 0
Start: 2021-12-03 | End: 2022-01-01

## 2021-12-03 RX ORDER — IBUPROFEN 200 MG
1 TABLET ORAL
Qty: 0 | Refills: 0 | DISCHARGE

## 2021-12-03 RX ADMIN — Medication 200 MILLIGRAM(S): at 05:11

## 2021-12-03 RX ADMIN — LIDOCAINE 1 APPLICATION(S): 4 CREAM TOPICAL at 05:11

## 2021-12-03 RX ADMIN — FINASTERIDE 5 MILLIGRAM(S): 5 TABLET, FILM COATED ORAL at 12:30

## 2021-12-03 RX ADMIN — ATROPA BELLADONNA AND OPIUM 1 SUPPOSITORY(S): 16.2; 6 SUPPOSITORY RECTAL at 12:29

## 2021-12-03 RX ADMIN — POLYETHYLENE GLYCOL 3350 17 GRAM(S): 17 POWDER, FOR SOLUTION ORAL at 00:23

## 2021-12-03 RX ADMIN — PIPERACILLIN AND TAZOBACTAM 25 GRAM(S): 4; .5 INJECTION, POWDER, LYOPHILIZED, FOR SOLUTION INTRAVENOUS at 12:30

## 2021-12-03 RX ADMIN — PIPERACILLIN AND TAZOBACTAM 25 GRAM(S): 4; .5 INJECTION, POWDER, LYOPHILIZED, FOR SOLUTION INTRAVENOUS at 04:56

## 2021-12-03 NOTE — PROGRESS NOTE ADULT - PROBLEM SELECTOR PLAN 4
- Currently normotensive  - Monitor BP daily  - DASH diet

## 2021-12-03 NOTE — DISCHARGE NOTE NURSING/CASE MANAGEMENT/SOCIAL WORK - PATIENT PORTAL LINK FT
You can access the FollowMyHealth Patient Portal offered by [facility] by registering at the following website: [email or web address]. By joining [employer]’s FollowMyHealth portal, you will also be able to view your health information using other applications (apps) compatible with our system.

## 2021-12-03 NOTE — PROGRESS NOTE ADULT - PROBLEM SELECTOR PROBLEM 1
Sepsis due to urinary tract infection

## 2021-12-03 NOTE — PROGRESS NOTE ADULT - ASSESSMENT
A 74 yo male PMHx CAD, s/p CABG x3, HTN and recently diagnosed BPH (w/ duarte in place) p/w dysuria x 5 days, now accompanied by fever and chills admitted to medicine for Sepsis secondary to UTI/ureteropyelitis in a setting of BPH.      
 A 76 yo male PMHx CAD, s/p CABG x3, HTN and recently diagnosed BPH (w/ duarte in place) p/w dysuria x 5 days, now accompanied by fever and chills admitted to medicine for Sepsis secondary to UTI/ureteropyelitis in a setting of BPH.      
 A 74 yo male PMHx CAD, s/p CABG x3, HTN and recently diagnosed BPH (w/ duarte in place) p/w dysuria x 5 days, now accompanied by fever and chills admitted to medicine for Sepsis secondary to UTI/ureteropyelitis in a setting of BPH.      
 A 74 yo male PMHx CAD, s/p CABG x3, HTN and recently diagnosed BPH (w/ duarte in place) p/w dysuria x 5 days, now accompanied by fever and chills admitted to medicine for Sepsis secondary to UTI/ureteropyelitis in a setting of BPH.      
 A 76 yo male PMHx CAD, s/p CABG x3, HTN and recently diagnosed BPH (w/ duarte in place) p/w dysuria x 5 days, now accompanied by fever and chills admitted to medicine for Sepsis secondary to UTI/ureteropyelitis in a setting of BPH.

## 2021-12-03 NOTE — PROGRESS NOTE ADULT - PROBLEM SELECTOR PLAN 6
Lovenox 40mg SC Q24h
Lovenox 40mg SC Q24h  Dispo: Patient declined home care.   Patient is clinically and hemodynamically stable for discharge today.  DC planning time: 34 min in coordinating DC plan with patient and team.   Outpatient follow up with PCP in 1 week post DC.  Outpatient follow up with Urology within 1 week post DC.   CM involved, offered Home Care services, patient declined.  Updated friend Silvia on current condition and plan for outpt Urology appt.
Lovenox 40mg SC Q24h  Dispo: Patient declined home care.   Patient is clinically and hemodynamically stable for discharge today PENDING UROLOGY.  DC planning time: 34 min in coordinating DC plan with patient and team.   Outpatient follow up with PCP in 1 week post DC.  Outpatient follow up with Urology within 1 week post DC.   CM involved, offered Home Care services, patient declined.
Lovenox 40mg SC Q24h  Dispo: pending hospital course, transition to PO antibiotics on DC , likely lisa.
Lovenox 40mg SC Q24h  Dispo: Patient declined home care.   Patient is clinically and hemodynamically stable for discharge today.   DC planning time: 34 min in coordinating DC plan with patient and team.   Outpatient follow up with PCP in 1 week post DC.  Outpatient follow up with Urology within 1 week post DC.   CM involved, offered Home Care services, patient declined.

## 2021-12-03 NOTE — PROGRESS NOTE ADULT - SUBJECTIVE AND OBJECTIVE BOX
PROGRESS NOTE:     Patient is a 76y old  Male who presents with a chief complaint of Urosepsis (02 Dec 2021 15:55)    SUBJECTIVE / OVERNIGHT EVENTS: Patient seen and evaluated at bedside. NO acute distress evidnet, no overnight events. Comfortable appearing- reports improvement in spasmodic penile pain. Reports no sob, chest pain, abd pain, nausea, vomiting, dizziness, palpitations or further complaints.     ADDITIONAL REVIEW OF SYSTEMS:    MEDICATIONS  (STANDING):  enoxaparin Injectable 40 milliGRAM(s) SubCutaneous every 24 hours  finasteride 5 milliGRAM(s) Oral daily  lidocaine 2% Gel 1 Application(s) Topical daily  piperacillin/tazobactam IVPB.. 3.375 Gram(s) IV Intermittent every 8 hours  tamsulosin 0.8 milliGRAM(s) Oral at bedtime    MEDICATIONS  (PRN):  acetaminophen     Tablet .. 650 milliGRAM(s) Oral every 6 hours PRN Temp greater or equal to 38C (100.4F), Mild Pain (1 - 3), Moderate Pain (4 - 6)    CAPILLARY BLOOD GLUCOSE    I&O's Summary    02 Dec 2021 07:01  -  03 Dec 2021 07:00  --------------------------------------------------------  IN: 0 mL / OUT: 650 mL / NET: -650 mL    PHYSICAL EXAM:  Vital Signs Last 24 Hrs  T(C): 36.6 (03 Dec 2021 12:19), Max: 37.3 (02 Dec 2021 20:38)  T(F): 97.8 (03 Dec 2021 12:19), Max: 99.2 (02 Dec 2021 20:38)  HR: 63 (03 Dec 2021 12:19) (63 - 73)  BP: 129/62 (03 Dec 2021 12:19) (128/58 - 160/68)  BP(mean): --  RR: 18 (03 Dec 2021 12:19) (16 - 18)  SpO2: 96% (03 Dec 2021 12:19) (96% - 100%)    CONSTITUTIONAL: NAD, well-developed, comfortable appearing.   RESPIRATORY: Normal respiratory effort; lungs are clear to auscultation bilaterally  CARDIOVASCULAR: Regular rate and rhythm, normal S1 and S2   No lower extremity edema; Peripheral pulses are 2+ bilaterally  ABDOMEN: Nontender to palpation, normoactive bowel sounds,  MUSCLOSKELETAL: no clubbing or cyanosis of digits; no joint swelling or tenderness to palpation  PSYCH: A+O to person, place, and time; affect appropriate    LABS: reviewed.                         10.6   6.51  )-----------( 236      ( 02 Dec 2021 06:36 )             30.9     12-02    139  |  107  |  9   ----------------------------<  101<H>  4.5   |  23  |  1.09    Ca    9.2      02 Dec 2021 06:36  Phos  2.5     12-02  Mg     2.10     12-02    RADIOLOGY & ADDITIONAL TESTS:  Results Reviewed:   Imaging Personally Reviewed:  Electrocardiogram Personally Reviewed:    COORDINATION OF CARE:  Care Discussed with Consultants/Other Providers [Y/N]:  Prior or Outpatient Records Reviewed [Y/N]:

## 2021-12-03 NOTE — PROGRESS NOTE ADULT - PROBLEM SELECTOR PLAN 1
- CT findings c/w Ureteropyelitis.   - Continue IV Zosyn 3.375 Q8h for pseudomonal coverage , recent duarte.   - Tamsulosin for BPH  - Tylenol PRN pain and Fever  - Pyridium for dysuria for 2 more days  - Follow up Bld Cx and UCx, ?culture data from [facility].   - COVID negative.
- CT findings c/w Ureteropyelitis.   - Continue IV Zosyn 3.375 Q8h for pseudomonal coverage , recent duarte.   - Tamsulosin/Finasteride for BPH. Consider Urology evaluation.   - Tylenol PRN pain and Fever  - Pyridium for dysuria for 2 more days  - Blood cultures, urine cultures NGTD.   - COVID negative.
- CT findings c/w Ureteropyelitis.   - Continue IV Zosyn 3.375 Q8h for pseudomonal coverage , recent duarte.   - Plan to transition to Ciprofloxacin BID to complete total 7 days.   - Tamsulosin/Finasteride for BPH.   - Tylenol PRN pain and Fever  - Pyridium for dysuria not to exceed 5 days .  - Blood cultures, urine cultures NGTD.   - COVID negative.
- CT findings c/w Ureteropyelitis.   - Continue IV Zosyn 3.375 Q8h for pseudomonal coverage , recent duarte.   - Plan to transition to Ciprofloxacin BID to complete total 7 days.   - Tamsulosin/Finasteride for BPH.   - UROLOGY CONSULTED -appreciate recs.  - Pyridium for dysuria not to exceed 5 days.   - Trial of Bellad[place] suppository.   - Tylenol PRN pain and Fever  - Blood cultures, urine cultures NGTD.   - COVID negative.
- CT findings c/w Ureteropyelitis.   - Continue IV Zosyn 3.375 Q8h for pseudomonal coverage , recent duarte.   - Plan to transition to Ciprofloxacin BID to complete total 7 days.   - Tamsulosin/Finasteride for BPH.   - UROLOGY CONSULTED GIVEN SPASMODIC PAIN.  - Pyridium for dysuria not to exceed 5 days.   - Trial of Belladona suppository.   - Tylenol PRN pain and Fever  - Blood cultures, urine cultures NGTD.   - COVID negative.

## 2021-12-03 NOTE — PROGRESS NOTE ADULT - PROBLEM SELECTOR PLAN 2
- Continue Tamsulosin 0.4mg qhs and started on Finasteride 5mg daily  - Passed TOV, voiding well. Check bladder scan prior to discharge.   - UROLOGY CONSULTED, FOLLOW UP RECS.   - Outpatient Urology follow up on discharge.
- Continue Tamsulosin 0.4mg qhs and started on Finasteride 5mg daily  - Passed TOV overnight.   - Outpatient Urology follow up on discharge.
- Continue Tamsulosin 0.4mg qhs and started on Finasteride 5mg daily  - Passed TOV, voiding well. Check bladder scan prior to discharge.   - UROLOGY CONSULTED, appreciate recs.   - Outpatient Urology follow up on discharge.
- Continue Tamsulosin 0.4mg qhs and started on Finasteride 5mg daily  - Passed TOV, voiding well. Check bladder scan prior to discharge.   - Outpatient Urology follow up on discharge.
- Continue Tamsulosin 0.4mg qhs and started on Finasteride 5mg daily  - Plan TOV likely today.

## 2021-12-03 NOTE — DISCHARGE NOTE NURSING/CASE MANAGEMENT/SOCIAL WORK - NSDCPEFALRISK_GEN_ALL_CORE
For information on Fall & Injury Prevention, visit: [email or web address] OR  [email or web address] OR  https://www.cdc.gov/steadi/patient.html

## 2021-12-03 NOTE — PROGRESS NOTE ADULT - PROBLEM SELECTOR PLAN 5
- Incidentally found on CT A/P  - O/p evaluation with adrenal mass protocol

## 2021-12-06 PROBLEM — N40.0 BENIGN PROSTATIC HYPERPLASIA WITHOUT LOWER URINARY TRACT SYMPTOMS: Chronic | Status: ACTIVE | Noted: 2021-11-28

## 2021-12-08 ENCOUNTER — APPOINTMENT (OUTPATIENT)
Dept: UROLOGY | Facility: CLINIC | Age: 76
End: 2021-12-08
Payer: COMMERCIAL

## 2021-12-08 VITALS
TEMPERATURE: 98.5 F | RESPIRATION RATE: 16 BRPM | SYSTOLIC BLOOD PRESSURE: 109 MMHG | HEIGHT: 68 IN | HEART RATE: 108 BPM | WEIGHT: 175 LBS | DIASTOLIC BLOOD PRESSURE: 70 MMHG | BODY MASS INDEX: 26.52 KG/M2

## 2021-12-08 PROCEDURE — 51798 US URINE CAPACITY MEASURE: CPT

## 2021-12-08 PROCEDURE — 99204 OFFICE O/P NEW MOD 45 MIN: CPT

## 2021-12-08 RX ORDER — TAMSULOSIN HYDROCHLORIDE 0.4 MG/1
0.4 CAPSULE ORAL
Qty: 60 | Refills: 5 | Status: ACTIVE | COMMUNITY
Start: 2021-12-08 | End: 1900-01-01

## 2021-12-08 NOTE — ASSESSMENT
[FreeTextEntry1] : would stay on mdications - rewrote - may be able to wean off the tamsuloin with time\par

## 2021-12-08 NOTE — PHYSICAL EXAM
[General Appearance - Well Developed] : well developed [General Appearance - Well Nourished] : well nourished [Edema] : no peripheral edema [Exaggerated Use Of Accessory Muscles For Inspiration] : no accessory muscle use [Abdomen Soft] : soft [Abdomen Tenderness] : non-tender [Abdomen Mass (___ Cm)] : no abdominal mass palpated [Abdomen Hernia] : no hernia was discovered [Urethral Meatus] : meatus normal [Penis Abnormality] : normal circumcised penis [Urinary Bladder Findings] : the bladder was normal on palpation [Scrotum] : the scrotum was normal [Epididymis] : the epididymides were normal [Testes Tenderness] : no tenderness of the testes [Testes Mass (___cm)] : there were no testicular masses [Rectal Exam - Rectum] : digital rectal exam was normal [No Prostate Nodules] : no prostate nodules [Prostate Size ___ gm] : prostate size [unfilled] gm [Normal Station and Gait] : the gait and station were normal for the patient's age [] : no rash [No Focal Deficits] : no focal deficits [Oriented To Time, Place, And Person] : oriented to person, place, and time

## 2021-12-08 NOTE — HISTORY OF PRESENT ILLNESS
[FreeTextEntry1] : HFU or recent admission for retention and UTIs.\par Around [place] had dysuria and extreme difficulty urinating along with pain in  the penis. Went to local hospital and a Jeffers placed and started antibiotics.\par Sent home but came to [facility] with fever and had elevated WBC 15k. Treated IV antibiotics and sent out on Cipro - CT scan noted ? ureteropyelitis - prostate i measured at 121cc. \par he was started on tamsulosin 0,8mg and finasteride and passed TOV.\par he has baseline LUts of frequency, nocturia 4-5 times, urgency - FOS OK with no pushing or intermittency though has remote h/o retention.\par Voiding better now though has run ut of finasteride. \par \par PVR 0.

## 2021-12-09 LAB — BACTERIA UR CULT: NORMAL

## 2021-12-21 ENCOUNTER — APPOINTMENT (OUTPATIENT)
Dept: UROLOGY | Facility: CLINIC | Age: 76
End: 2021-12-21

## 2022-03-04 ENCOUNTER — APPOINTMENT (OUTPATIENT)
Dept: UROLOGY | Facility: CLINIC | Age: 77
End: 2022-03-04
Payer: COMMERCIAL

## 2022-03-04 VITALS
WEIGHT: 175 LBS | BODY MASS INDEX: 26.52 KG/M2 | SYSTOLIC BLOOD PRESSURE: 162 MMHG | DIASTOLIC BLOOD PRESSURE: 80 MMHG | HEART RATE: 93 BPM | HEIGHT: 68 IN | RESPIRATION RATE: 17 BRPM | TEMPERATURE: 98.2 F

## 2022-03-04 VITALS — DIASTOLIC BLOOD PRESSURE: 72 MMHG | RESPIRATION RATE: 17 BRPM | SYSTOLIC BLOOD PRESSURE: 180 MMHG | HEART RATE: 82 BPM

## 2022-03-04 PROCEDURE — 99212 OFFICE O/P EST SF 10 MIN: CPT

## 2022-03-04 NOTE — HISTORY OF PRESENT ILLNESS
[FreeTextEntry1] : HFU or recent admission for retention and UTIs.\par [place] had dysuria and extreme difficulty urinating along with pain in  the penis. Went to local hospital and a Jeffers placed and started antibiotics.\par Sent home but came to [facility] with fever and had elevated WBC 15k. Treated IV antibiotics and sent out on Cipro - CT scan noted ? ureteropyelitis - prostate i measured at 121cc. \par he was started on tamsulosin 0,8mg and finasteride and passed TOV.\par he has baseline LUts of frequency, nocturia 4-5 times, urgency - FOS OK with no pushing or intermittency though has remote h/o retention.\par Voiding better now though has run ut of finasteride. \par \par  3/22 - happy with results - much less frequency, no urgency and nocturia 2 times.

## 2022-05-31 ENCOUNTER — APPOINTMENT (OUTPATIENT)
Dept: UROLOGY | Facility: CLINIC | Age: 77
End: 2022-05-31
Payer: COMMERCIAL

## 2022-05-31 PROCEDURE — 99214 OFFICE O/P EST MOD 30 MIN: CPT

## 2022-05-31 NOTE — PHYSICAL EXAM
[General Appearance - Well Developed] : well developed [General Appearance - Well Nourished] : well nourished [Abdomen Soft] : soft [Abdomen Tenderness] : non-tender [Penis Abnormality] : normal circumcised penis [Urinary Bladder Findings] : the bladder was normal on palpation [Scrotum] : the scrotum was normal [Edema] : no peripheral edema [] : no respiratory distress [Exaggerated Use Of Accessory Muscles For Inspiration] : no accessory muscle use [Oriented To Time, Place, And Person] : oriented to person, place, and time [Normal Station and Gait] : the gait and station were normal for the patient's age [Inguinal Lymph Nodes Enlarged Bilaterally] : inguinal

## 2022-05-31 NOTE — ASSESSMENT
[FreeTextEntry1] : will check urine studies - if negative restart tamsulosin.\par needs f/u imaging on adrenal

## 2022-05-31 NOTE — HISTORY OF PRESENT ILLNESS
[FreeTextEntry1] : HFU or recent admission for retention and UTIs.\par [place] had dysuria and extreme difficulty urinating along with pain in  the penis. Went to local hospital and a Jeffers placed and started antibiotics.\par Sent home but came to [facility] with fever and had elevated WBC 15k. Treated IV antibiotics and sent out on Cipro - CT scan noted ? ureteropyelitis - prostate i measured at 121cc. \par he was started on tamsulosin 0,8mg and finasteride and passed TOV.\par he has baseline LUts of frequency, nocturia 4-5 times, urgency - FOS OK with no pushing or intermittency though has remote h/o retention.\par Voiding better now though has run out of finasteride. \par \par  3/22 - happy with results - much less frequency, no urgency and nocturia 2 times.  \par \par 5/22 - here today with @2 weeks of dysuria and increased urgency; when stands up he has to run.\par else voiding OK\par PVR 28  \par he also noted a h/o "mushroom" above his kidney. he has Left sided abdominal pain form time to time\par Noted in PACS a 2.7cm enhancing lesion with calcification left adrenal gland. Plus some cysts left adrenal gland.

## 2022-06-01 LAB
APPEARANCE: CLEAR
BACTERIA UR CULT: NORMAL
BACTERIA: NEGATIVE
BILIRUBIN URINE: NEGATIVE
BLOOD URINE: NEGATIVE
COLOR: NORMAL
GLUCOSE QUALITATIVE U: NEGATIVE
HYALINE CASTS: 1 /LPF
KETONES URINE: NEGATIVE
LEUKOCYTE ESTERASE URINE: NEGATIVE
MICROSCOPIC-UA: NORMAL
NITRITE URINE: NEGATIVE
PH URINE: 6
PROTEIN URINE: NEGATIVE
RED BLOOD CELLS URINE: 1 /HPF
SPECIFIC GRAVITY URINE: 1.01
SQUAMOUS EPITHELIAL CELLS: 0 /HPF
UROBILINOGEN URINE: NORMAL
WHITE BLOOD CELLS URINE: 0 /HPF

## 2022-06-22 ENCOUNTER — OUTPATIENT (OUTPATIENT)
Dept: OUTPATIENT SERVICES | Facility: HOSPITAL | Age: 77
LOS: 1 days | End: 2022-06-22
Payer: COMMERCIAL

## 2022-06-22 ENCOUNTER — APPOINTMENT (OUTPATIENT)
Dept: MRI IMAGING | Facility: IMAGING CENTER | Age: 77
End: 2022-06-22

## 2022-06-22 DIAGNOSIS — I25.701 ATHEROSCLEROSIS OF CORONARY ARTERY BYPASS GRAFT(S), UNSPECIFIED, WITH ANGINA PECTORIS WITH DOCUMENTED SPASM: Chronic | ICD-10-CM

## 2022-06-22 DIAGNOSIS — E27.8 OTHER SPECIFIED DISORDERS OF ADRENAL GLAND: ICD-10-CM

## 2022-06-22 PROCEDURE — A9585: CPT

## 2022-06-22 PROCEDURE — 74183 MRI ABD W/O CNTR FLWD CNTR: CPT | Mod: 26

## 2022-06-22 PROCEDURE — 74183 MRI ABD W/O CNTR FLWD CNTR: CPT

## 2022-08-15 ENCOUNTER — EMERGENCY (EMERGENCY)
Facility: HOSPITAL | Age: 77
LOS: 1 days | Discharge: HOME | End: 2022-08-15
Attending: EMERGENCY MEDICINE | Admitting: EMERGENCY MEDICINE

## 2022-08-15 VITALS
DIASTOLIC BLOOD PRESSURE: 64 MMHG | SYSTOLIC BLOOD PRESSURE: 164 MMHG | RESPIRATION RATE: 16 BRPM | OXYGEN SATURATION: 100 % | TEMPERATURE: 98 F | HEART RATE: 58 BPM | HEIGHT: 67 IN

## 2022-08-15 VITALS
TEMPERATURE: 98 F | OXYGEN SATURATION: 100 % | HEART RATE: 55 BPM | DIASTOLIC BLOOD PRESSURE: 60 MMHG | SYSTOLIC BLOOD PRESSURE: 143 MMHG | RESPIRATION RATE: 16 BRPM

## 2022-08-15 DIAGNOSIS — I25.701 ATHEROSCLEROSIS OF CORONARY ARTERY BYPASS GRAFT(S), UNSPECIFIED, WITH ANGINA PECTORIS WITH DOCUMENTED SPASM: Chronic | ICD-10-CM

## 2022-08-15 LAB
ALBUMIN SERPL ELPH-MCNC: 3.8 G/DL — SIGNIFICANT CHANGE UP (ref 3.3–5)
ALP SERPL-CCNC: 100 U/L — SIGNIFICANT CHANGE UP (ref 40–120)
ALT FLD-CCNC: 19 U/L — SIGNIFICANT CHANGE UP (ref 4–41)
ANION GAP SERPL CALC-SCNC: 9 MMOL/L — SIGNIFICANT CHANGE UP (ref 7–14)
APPEARANCE UR: CLEAR — SIGNIFICANT CHANGE UP
APTT BLD: 27.3 SEC — SIGNIFICANT CHANGE UP (ref 27–36.3)
AST SERPL-CCNC: 24 U/L — SIGNIFICANT CHANGE UP (ref 4–40)
B PERT DNA SPEC QL NAA+PROBE: SIGNIFICANT CHANGE UP
B PERT+PARAPERT DNA PNL SPEC NAA+PROBE: SIGNIFICANT CHANGE UP
BASE EXCESS BLDV CALC-SCNC: 1.2 MMOL/L — SIGNIFICANT CHANGE UP (ref -2–3)
BASOPHILS # BLD AUTO: 0.03 K/UL — SIGNIFICANT CHANGE UP (ref 0–0.2)
BASOPHILS NFR BLD AUTO: 0.7 % — SIGNIFICANT CHANGE UP (ref 0–2)
BILIRUB SERPL-MCNC: 0.5 MG/DL — SIGNIFICANT CHANGE UP (ref 0.2–1.2)
BILIRUB UR-MCNC: NEGATIVE — SIGNIFICANT CHANGE UP
BLOOD GAS VENOUS COMPREHENSIVE RESULT: SIGNIFICANT CHANGE UP
BORDETELLA PARAPERTUSSIS (RAPRVP): SIGNIFICANT CHANGE UP
BUN SERPL-MCNC: 13 MG/DL — SIGNIFICANT CHANGE UP (ref 7–23)
C PNEUM DNA SPEC QL NAA+PROBE: SIGNIFICANT CHANGE UP
CALCIUM SERPL-MCNC: 9.4 MG/DL — SIGNIFICANT CHANGE UP (ref 8.4–10.5)
CHLORIDE BLDV-SCNC: 105 MMOL/L — SIGNIFICANT CHANGE UP (ref 96–108)
CHLORIDE SERPL-SCNC: 102 MMOL/L — SIGNIFICANT CHANGE UP (ref 98–107)
CO2 BLDV-SCNC: 30 MMOL/L — HIGH (ref 22–26)
CO2 SERPL-SCNC: 25 MMOL/L — SIGNIFICANT CHANGE UP (ref 22–31)
COLOR SPEC: SIGNIFICANT CHANGE UP
CREAT SERPL-MCNC: 1.15 MG/DL — SIGNIFICANT CHANGE UP (ref 0.5–1.3)
DIFF PNL FLD: NEGATIVE — SIGNIFICANT CHANGE UP
EGFR: 66 ML/MIN/1.73M2 — SIGNIFICANT CHANGE UP
EOSINOPHIL # BLD AUTO: 0.04 K/UL — SIGNIFICANT CHANGE UP (ref 0–0.5)
EOSINOPHIL NFR BLD AUTO: 0.9 % — SIGNIFICANT CHANGE UP (ref 0–6)
FLUAV SUBTYP SPEC NAA+PROBE: SIGNIFICANT CHANGE UP
FLUBV RNA SPEC QL NAA+PROBE: SIGNIFICANT CHANGE UP
GAS PNL BLDV: 138 MMOL/L — SIGNIFICANT CHANGE UP (ref 136–145)
GAS PNL BLDV: SIGNIFICANT CHANGE UP
GLUCOSE BLDV-MCNC: 108 MG/DL — HIGH (ref 70–99)
GLUCOSE SERPL-MCNC: 118 MG/DL — HIGH (ref 70–99)
GLUCOSE UR QL: NEGATIVE — SIGNIFICANT CHANGE UP
HADV DNA SPEC QL NAA+PROBE: SIGNIFICANT CHANGE UP
HCO3 BLDV-SCNC: 28 MMOL/L — SIGNIFICANT CHANGE UP (ref 22–29)
HCOV 229E RNA SPEC QL NAA+PROBE: SIGNIFICANT CHANGE UP
HCOV HKU1 RNA SPEC QL NAA+PROBE: SIGNIFICANT CHANGE UP
HCOV NL63 RNA SPEC QL NAA+PROBE: SIGNIFICANT CHANGE UP
HCOV OC43 RNA SPEC QL NAA+PROBE: SIGNIFICANT CHANGE UP
HCT VFR BLD CALC: 35.4 % — LOW (ref 39–50)
HCT VFR BLDA CALC: 37 % — LOW (ref 39–51)
HGB BLD CALC-MCNC: 12.2 G/DL — LOW (ref 13–17)
HGB BLD-MCNC: 12 G/DL — LOW (ref 13–17)
HMPV RNA SPEC QL NAA+PROBE: SIGNIFICANT CHANGE UP
HPIV1 RNA SPEC QL NAA+PROBE: SIGNIFICANT CHANGE UP
HPIV2 RNA SPEC QL NAA+PROBE: SIGNIFICANT CHANGE UP
HPIV3 RNA SPEC QL NAA+PROBE: SIGNIFICANT CHANGE UP
HPIV4 RNA SPEC QL NAA+PROBE: SIGNIFICANT CHANGE UP
IANC: 3.16 K/UL — SIGNIFICANT CHANGE UP (ref 1.8–7.4)
IMM GRANULOCYTES NFR BLD AUTO: 0.2 % — SIGNIFICANT CHANGE UP (ref 0–1.5)
INR BLD: 1.01 RATIO — SIGNIFICANT CHANGE UP (ref 0.88–1.16)
KETONES UR-MCNC: NEGATIVE — SIGNIFICANT CHANGE UP
LACTATE BLDV-MCNC: 1.6 MMOL/L — SIGNIFICANT CHANGE UP (ref 0.5–2)
LEUKOCYTE ESTERASE UR-ACNC: NEGATIVE — SIGNIFICANT CHANGE UP
LYMPHOCYTES # BLD AUTO: 1 K/UL — SIGNIFICANT CHANGE UP (ref 1–3.3)
LYMPHOCYTES # BLD AUTO: 22.1 % — SIGNIFICANT CHANGE UP (ref 13–44)
M PNEUMO DNA SPEC QL NAA+PROBE: SIGNIFICANT CHANGE UP
MAGNESIUM SERPL-MCNC: 2 MG/DL — SIGNIFICANT CHANGE UP (ref 1.6–2.6)
MCHC RBC-ENTMCNC: 33.9 GM/DL — SIGNIFICANT CHANGE UP (ref 32–36)
MCHC RBC-ENTMCNC: 34.5 PG — HIGH (ref 27–34)
MCV RBC AUTO: 101.7 FL — HIGH (ref 80–100)
MONOCYTES # BLD AUTO: 0.29 K/UL — SIGNIFICANT CHANGE UP (ref 0–0.9)
MONOCYTES NFR BLD AUTO: 6.4 % — SIGNIFICANT CHANGE UP (ref 2–14)
NEUTROPHILS # BLD AUTO: 3.16 K/UL — SIGNIFICANT CHANGE UP (ref 1.8–7.4)
NEUTROPHILS NFR BLD AUTO: 69.7 % — SIGNIFICANT CHANGE UP (ref 43–77)
NITRITE UR-MCNC: NEGATIVE — SIGNIFICANT CHANGE UP
NRBC # BLD: 0 /100 WBCS — SIGNIFICANT CHANGE UP
NRBC # FLD: 0 K/UL — SIGNIFICANT CHANGE UP
NT-PROBNP SERPL-SCNC: 468 PG/ML — HIGH
PCO2 BLDV: 55 MMHG — SIGNIFICANT CHANGE UP (ref 42–55)
PH BLDV: 7.32 — SIGNIFICANT CHANGE UP (ref 7.32–7.43)
PH UR: 7 — SIGNIFICANT CHANGE UP (ref 5–8)
PHOSPHATE SERPL-MCNC: 3.1 MG/DL — SIGNIFICANT CHANGE UP (ref 2.5–4.5)
PLATELET # BLD AUTO: 146 K/UL — LOW (ref 150–400)
PO2 BLDV: 35 MMHG — SIGNIFICANT CHANGE UP
POTASSIUM BLDV-SCNC: 4.1 MMOL/L — SIGNIFICANT CHANGE UP (ref 3.5–5.1)
POTASSIUM SERPL-MCNC: 4.2 MMOL/L — SIGNIFICANT CHANGE UP (ref 3.5–5.3)
POTASSIUM SERPL-SCNC: 4.2 MMOL/L — SIGNIFICANT CHANGE UP (ref 3.5–5.3)
PROT SERPL-MCNC: 6.6 G/DL — SIGNIFICANT CHANGE UP (ref 6–8.3)
PROT UR-MCNC: ABNORMAL
PROTHROM AB SERPL-ACNC: 11.7 SEC — SIGNIFICANT CHANGE UP (ref 10.5–13.4)
RAPID RVP RESULT: SIGNIFICANT CHANGE UP
RBC # BLD: 3.48 M/UL — LOW (ref 4.2–5.8)
RBC # FLD: 11.7 % — SIGNIFICANT CHANGE UP (ref 10.3–14.5)
RSV RNA SPEC QL NAA+PROBE: SIGNIFICANT CHANGE UP
RV+EV RNA SPEC QL NAA+PROBE: SIGNIFICANT CHANGE UP
SAO2 % BLDV: 50 % — SIGNIFICANT CHANGE UP
SARS-COV-2 RNA SPEC QL NAA+PROBE: SIGNIFICANT CHANGE UP
SODIUM SERPL-SCNC: 136 MMOL/L — SIGNIFICANT CHANGE UP (ref 135–145)
SP GR SPEC: 1.01 — SIGNIFICANT CHANGE UP (ref 1.01–1.05)
TROPONIN T, HIGH SENSITIVITY RESULT: 19 NG/L — SIGNIFICANT CHANGE UP
TROPONIN T, HIGH SENSITIVITY RESULT: 21 NG/L — SIGNIFICANT CHANGE UP
UROBILINOGEN FLD QL: SIGNIFICANT CHANGE UP
WBC # BLD: 4.53 K/UL — SIGNIFICANT CHANGE UP (ref 3.8–10.5)
WBC # FLD AUTO: 4.53 K/UL — SIGNIFICANT CHANGE UP (ref 3.8–10.5)

## 2022-08-15 PROCEDURE — 70450 CT HEAD/BRAIN W/O DYE: CPT | Mod: 26,MA

## 2022-08-15 PROCEDURE — 93010 ELECTROCARDIOGRAM REPORT: CPT | Mod: NC

## 2022-08-15 PROCEDURE — 71045 X-RAY EXAM CHEST 1 VIEW: CPT | Mod: 26

## 2022-08-15 PROCEDURE — 99285 EMERGENCY DEPT VISIT HI MDM: CPT | Mod: 25

## 2022-08-15 NOTE — ED PROVIDER NOTE - NSFOLLOWUPINSTRUCTIONS_ED_ALL_ED_FT
History of in vitro fertilization  X 2 - failed
You were seen in the emergency department. Your results are attached.    Please follow up with your family doctor or a cardiologist as discussed for an echocardiogram and stress test.     Please return to the emergency department with any new or worsening symptoms, including but not limited to:  -Severe chest pain  -Shortness of breath  -New swelling in the legs  -High fevers  -You are confused or faint      [facility]  General Internal Medicine  [address] [place] [address]  Phone: [phone]  Fax:     [facility]  Internal Medicine  [address] [place] [address]  Phone: [phone]  Fax: [phone]    [facility]  Cardiology  [address] [place] [address]  Phone: [phone]    Chest Pain  WHAT YOU NEED TO KNOW:  Chest pain can be caused by a range of conditions, from not serious to life-threatening. Chest pain can be a symptom of a digestive problem, such as acid reflux or a stomach ulcer. An anxiety attack or a strong emotion, such as anger, can also cause chest pain. Infection, inflammation, or a fracture in the bones or cartilage in your chest can cause pain or discomfort. Sometimes chest pain or pressure is caused by poor blood flow to your heart (angina). Chest pain may also be caused by life-threatening conditions such as a heart attack or blood clot in your lungs.   DISCHARGE INSTRUCTIONS:  Call 911 if:   •You have any of the following signs of a heart attack: ?Squeezing, pressure, or pain in your chest  ?You may also have any of the following: ?Discomfort or pain in your back, neck, jaw, stomach, or arm  ?Shortness of breath  ?Nausea or vomiting  ?Lightheadedness or a sudden cold sweat  Seek care immediately if:   •You have chest discomfort that gets worse, even with medicine.  •You cough or vomit blood.   •Your bowel movements are black or bloody.   •You cannot stop vomiting, or it hurts to swallow.   Contact your healthcare provider if:   •You have questions or concerns about your condition or care.  Medicines:   •Medicines may be given to treat the cause of your chest pain. Examples include pain medicine, anxiety medicine, or medicines to increase blood flow to your heart.   •Do not take certain medicines without asking your healthcare provider first. These include NSAIDs, herbal or vitamin supplements, or hormones (estrogen or progestin).   •Take your medicine as directed. Contact your healthcare provider if you think your medicine is not helping or if you have side effects. Tell him or her if you are allergic to any medicine. Keep a list of the medicines, vitamins, and herbs you take. Include the amounts, and when and why you take them. Bring the list or the pill bottles to follow-up visits. Carry your medicine list with you in case of an emergency.  Follow up with your healthcare provider within 72 hours, or as directed: You may need to return for more tests to find the cause of your chest pain. You may be referred to a specialist, such as a cardiologist or gastroenterologist. Write down your questions so you remember to ask them during your visits.   Healthy living tips: The following are general healthy guidelines. If your chest pain is caused by a heart problem, your healthcare provider will give you specific guidelines to follow.  •Do not smoke. Nicotine and other chemicals in cigarettes and cigars can cause lung and heart damage. Ask your healthcare provider for information if you currently smoke and need help to quit. E-cigarettes or smokeless tobacco still contain nicotine. Talk to your healthcare provider before you use these products.   •Eat a variety of healthy, low-fat, low-salt foods. Healthy foods include fruits, vegetables, whole-grain breads, low-fat dairy products, beans, lean meats, and fish. Ask for more information about a heart healthy diet.  •Drink plenty of water every day. Your body is made of mostly water. Water helps your body to control temperature and blood pressure. Ask your healthcare provider how much water you should drink every day.  •Ask about activity. Your healthcare provider will tell you which activities to limit or avoid. Ask when you can drive, return to work, and have sex. Ask about the best exercise plan for you.  •Maintain a healthy weight. Ask your healthcare provider how much you should weigh. Ask him or her to help you create a weight loss plan if you are overweight.   If you have a stent:   •Carry your stent card with you at all times.   •Let all healthcare providers know that you have a stent.     [facility] Internal Medicine  General Internal Medicine  [address] [place] [address]  Phone: [phone]  Fax:     [facility]  Internal Medicine  [address] [place] [address]  Phone: [phone]  Fax: [phone]    [facility]  Cardiology  [address] [place] [address]  Phone: [phone]    Chest Pain  WHAT YOU NEED TO KNOW:  Chest pain can be caused by a range of conditions, from not serious to life-threatening. Chest pain can be a symptom of a digestive problem, such as acid reflux or a stomach ulcer. An anxiety attack or a strong emotion, such as anger, can also cause chest pain. Infection, inflammation, or a fracture in the bones or cartilage in your chest can cause pain or discomfort. Sometimes chest pain or pressure is caused by poor blood flow to your heart (angina). Chest pain may also be caused by life-threatening conditions such as a heart attack or blood clot in your lungs.   DISCHARGE INSTRUCTIONS:  Call 911 if:   •You have any of the following signs of a heart attack: ?Squeezing, pressure, or pain in your chest  ?You may also have any of the following: ?Discomfort or pain in your back, neck, jaw, stomach, or arm  ?Shortness of breath  ?Nausea or vomiting  ?Lightheadedness or a sudden cold sweat  Seek care immediately if:   •You have chest discomfort that gets worse, even with medicine.  •You cough or vomit blood.   •Your bowel movements are black or bloody.   •You cannot stop vomiting, or it hurts to swallow.   Contact your healthcare provider if:   •You have questions or concerns about your condition or care.  Medicines:   •Medicines may be given to treat the cause of your chest pain. Examples include pain medicine, anxiety medicine, or medicines to increase blood flow to your heart.   •Do not take certain medicines without asking your healthcare provider first. These include NSAIDs, herbal or vitamin supplements, or hormones (estrogen or progestin).   •Take your medicine as directed. Contact your healthcare provider if you think your medicine is not helping or if you have side effects. Tell him or her if you are allergic to any medicine. Keep a list of the medicines, vitamins, and herbs you take. Include the amounts, and when and why you take them. Bring the list or the pill bottles to follow-up visits. Carry your medicine list with you in case of an emergency.  Follow up with your healthcare provider within 72 hours, or as directed: You may need to return for more tests to find the cause of your chest pain. You may be referred to a specialist, such as a cardiologist or gastroenterologist. Write down your questions so you remember to ask them during your visits.   Healthy living tips: The following are general healthy guidelines. If your chest pain is caused by a heart problem, your healthcare provider will give you specific guidelines to follow.  •Do not smoke. Nicotine and other chemicals in cigarettes and cigars can cause lung and heart damage. Ask your healthcare provider for information if you currently smoke and need help to quit. E-cigarettes or smokeless tobacco still contain nicotine. Talk to your healthcare provider before you use these products.   •Eat a variety of healthy, low-fat, low-salt foods. Healthy foods include fruits, vegetables, whole-grain breads, low-fat dairy products, beans, lean meats, and fish. Ask for more information about a heart healthy diet.  •Drink plenty of water every day. Your body is made of mostly water. Water helps your body to control temperature and blood pressure. Ask your healthcare provider how much water you should drink every day.  •Ask about activity. Your healthcare provider will tell you which activities to limit or avoid. Ask when you can drive, return to work, and have sex. Ask about the best exercise plan for you.  •Maintain a healthy weight. Ask your healthcare provider how much you should weigh. Ask him or her to help you create a weight loss plan if you are overweight.   If you have a stent:   •Carry your stent card with you at all times.   •Let all healthcare providers know that you have a stent.

## 2022-08-15 NOTE — ED ADULT TRIAGE NOTE - CHIEF COMPLAINT QUOTE
Pt. brought to [facility] ED by [facility]. Pt. had episode of head spinning, chest pain,  felt wobbly, and had ringing in the ears. PMH: triple bypass x 10 years ago. EMS gave asa. Denies current chest pain. NAD noted. Takes blood thinner(does not remember name).

## 2022-08-15 NOTE — ED PROVIDER NOTE - OBJECTIVE STATEMENT
77 y/o M with h/o CAD s/p CABG, HTN, BPH here with dizziness.  Pt reports around 8pm this evening he started having room-spinning dizziness and unsteady sensation when he was attempting to shower.  Sxs were worse when he sat or stood up, prompting him to call EMS.  Sxs have since resolved, but upon EMS arrival he also noted mild chest tightness.  Sxs have all since resolved.  No fever, ha, vision changes, weakness, n/v, sob, cough, back pain.  Pt endorses long-standing (months) early satiety and bloating.  Otherwise no recent illnesses.

## 2022-08-15 NOTE — ED PROVIDER NOTE - PATIENT PORTAL LINK FT
You can access the FollowMyHealth Patient Portal offered by [facility] by registering at the following website: [email or web address]. By joining [facility]’s FollowMyHealth portal, you will also be able to view your health information using other applications (apps) compatible with our system.

## 2022-08-15 NOTE — ED ADULT NURSE NOTE - CHIEF COMPLAINT QUOTE
Pt. brought to [facility] ED by [facility]. Pt. had episode of head spinning, chest pain,  felt wobbly, and had ringing in the ears. PMH: triple bypass x 10 years ago. EMS gave asa. Denies current chest pain. NAD noted. Takes blood thinner(does not remember name).

## 2022-08-15 NOTE — ED PROVIDER NOTE - CLINICAL SUMMARY MEDICAL DECISION MAKING FREE TEXT BOX
75 y/o M with h/o HTN, CAD s/p CABG, BPH here after an episode of positional dizziness this evening.  Sxs since resolved, no focal neuro deficits at this time.  Do not suspect central etiology at this time, likely peripheral vertigo.  Given concomitant cp, will screen for cardiac event with ekg, trop; eval for underlying metabolic disturbance, infection, although less likely.  Will obtain CT head given h/o chronic poor appetite/FTT? (pt with recent outpatient abd imaging that was unremarkable).

## 2022-08-15 NOTE — ED PROVIDER NOTE - PROGRESS NOTE DETAILS
Flavia Narayanan PGY-3: Troponins stable. Labs otherwise requiring no intervention. CTH pending. Flavia Narayanan PGY-3: Labs and CTH requiring no intervention. Discussed results with patient. States it has been many years since he had stress and echo. Has a family doctor. Discussed need for cards f/u with patient. To Dc.

## 2022-08-16 LAB
CULTURE RESULTS: SIGNIFICANT CHANGE UP
SPECIMEN SOURCE: SIGNIFICANT CHANGE UP

## 2022-08-23 ENCOUNTER — APPOINTMENT (OUTPATIENT)
Dept: UROLOGY | Facility: CLINIC | Age: 77
End: 2022-08-23

## 2022-09-13 DIAGNOSIS — Z95.1 PRESENCE OF AORTOCORONARY BYPASS GRAFT: ICD-10-CM

## 2022-09-13 DIAGNOSIS — Z82.49 FAMILY HISTORY OF ISCHEMIC HEART DISEASE AND OTHER DISEASES OF THE CIRCULATORY SYSTEM: ICD-10-CM

## 2022-09-13 DIAGNOSIS — Z86.79 PERSONAL HISTORY OF OTHER DISEASES OF THE CIRCULATORY SYSTEM: ICD-10-CM

## 2022-10-27 ENCOUNTER — NON-APPOINTMENT (OUTPATIENT)
Age: 77
End: 2022-10-27

## 2022-10-27 ENCOUNTER — APPOINTMENT (OUTPATIENT)
Dept: CARDIOLOGY | Facility: CLINIC | Age: 77
End: 2022-10-27

## 2022-10-27 VITALS
HEART RATE: 65 BPM | SYSTOLIC BLOOD PRESSURE: 183 MMHG | OXYGEN SATURATION: 98 % | TEMPERATURE: 98 F | DIASTOLIC BLOOD PRESSURE: 90 MMHG

## 2022-10-27 VITALS — WEIGHT: 183 LBS | BODY MASS INDEX: 27.83 KG/M2

## 2022-10-27 DIAGNOSIS — Z87.440 PERSONAL HISTORY OF URINARY (TRACT) INFECTIONS: ICD-10-CM

## 2022-10-27 DIAGNOSIS — Z87.898 PERSONAL HISTORY OF OTHER SPECIFIED CONDITIONS: ICD-10-CM

## 2022-10-27 DIAGNOSIS — Z78.9 OTHER SPECIFIED HEALTH STATUS: ICD-10-CM

## 2022-10-27 PROCEDURE — 93000 ELECTROCARDIOGRAM COMPLETE: CPT

## 2022-10-27 PROCEDURE — 99203 OFFICE O/P NEW LOW 30 MIN: CPT | Mod: 25

## 2022-10-27 RX ORDER — METOPROLOL SUCCINATE 25 MG/1
25 TABLET, EXTENDED RELEASE ORAL DAILY
Refills: 0 | Status: ACTIVE | COMMUNITY
Start: 2022-03-04

## 2022-10-27 RX ORDER — CILOSTAZOL 50 MG/1
50 TABLET ORAL
Qty: 30 | Refills: 0 | Status: ACTIVE | COMMUNITY
Start: 2022-10-15

## 2022-10-27 RX ORDER — ATORVASTATIN CALCIUM 20 MG/1
20 TABLET, FILM COATED ORAL DAILY
Refills: 0 | Status: ACTIVE | COMMUNITY
Start: 2022-07-07

## 2022-10-27 NOTE — CARDIOLOGY SUMMARY
[de-identified] : \par 10/27/22 - consider sinus bradycardia, 59 bpm, old inferior infarct, nonspecific ST abnormality

## 2022-10-27 NOTE — HISTORY OF PRESENT ILLNESS
[FreeTextEntry1] : Patient with CAD (s/p CABG, HTN, and BPH. Conditions have been stable. Currently doing okay. Complains of some mid sternal chest pain after trying to lift a person. He thinks he injured some muscle or bone. Denies shortness of breath or palpitations.

## 2022-11-02 ENCOUNTER — NON-APPOINTMENT (OUTPATIENT)
Age: 77
End: 2022-11-02

## 2022-11-11 ENCOUNTER — APPOINTMENT (OUTPATIENT)
Dept: UROLOGY | Facility: CLINIC | Age: 77
End: 2022-11-11

## 2022-11-22 ENCOUNTER — APPOINTMENT (OUTPATIENT)
Dept: UROLOGY | Facility: CLINIC | Age: 77
End: 2022-11-22

## 2022-11-22 DIAGNOSIS — N40.0 BENIGN PROSTATIC HYPERPLASIA WITHOUT LOWER URINARY TRACT SYMPMS: ICD-10-CM

## 2022-11-22 DIAGNOSIS — E27.8 OTHER SPECIFIED DISORDERS OF ADRENAL GLAND: ICD-10-CM

## 2022-11-22 PROCEDURE — 99213 OFFICE O/P EST LOW 20 MIN: CPT

## 2022-11-22 NOTE — HISTORY OF PRESENT ILLNESS
[FreeTextEntry1] : HFU or recent admission for retention and UTIs.\par [place] had dysuria and extreme difficulty urinating along with pain in  the penis. Went to local hospital and a Jeffers placed and started antibiotics.\par Sent home but came to [facility] with fever and had elevated WBC 15k. Treated IV antibiotics and sent out on Cipro - CT scan noted ? ureteropyelitis - prostate i measured at 121cc. \par he was started on tamsulosin 0,8mg and finasteride and passed TOV.\par he has baseline LUts of frequency, nocturia 4-5 times, urgency - FOS OK with no pushing or intermittency though has remote h/o retention.\par Voiding better now though has run out of finasteride. \par \par  3/22 - happy with results - much less frequency, no urgency and nocturia 2 times.  \par \par 5/22 - here today with @2 weeks of dysuria and increased urgency; when stands up he has to run.\par else voiding OK\par PVR 28  \par he also noted a h/o "mushroom" above his kidney. he has Left sided abdominal pain form time to time\par Noted in PACS a 2.7cm enhancing lesion with calcification left adrenal gland. Plus some cysts left adrenal gland. \par \par 11/22 on finasteride - doing well. Has some urgency and nocturia 2 times. \par No UTI symptoms.\par The MRI noted adrenal adenoma

## 2023-02-08 ENCOUNTER — APPOINTMENT (OUTPATIENT)
Dept: CARDIOLOGY | Facility: CLINIC | Age: 78
End: 2023-02-08
Payer: COMMERCIAL

## 2023-02-08 ENCOUNTER — NON-APPOINTMENT (OUTPATIENT)
Age: 78
End: 2023-02-08

## 2023-02-08 VITALS
HEIGHT: 68 IN | OXYGEN SATURATION: 98 % | BODY MASS INDEX: 27.74 KG/M2 | WEIGHT: 183 LBS | RESPIRATION RATE: 16 BRPM | DIASTOLIC BLOOD PRESSURE: 84 MMHG | SYSTOLIC BLOOD PRESSURE: 148 MMHG | HEART RATE: 73 BPM

## 2023-02-08 DIAGNOSIS — I10 ESSENTIAL (PRIMARY) HYPERTENSION: ICD-10-CM

## 2023-02-08 DIAGNOSIS — R07.9 CHEST PAIN, UNSPECIFIED: ICD-10-CM

## 2023-02-08 DIAGNOSIS — I25.10 ATHEROSCLEROTIC HEART DISEASE OF NATIVE CORONARY ARTERY W/OUT ANGINA PECTORIS: ICD-10-CM

## 2023-02-08 PROCEDURE — 93000 ELECTROCARDIOGRAM COMPLETE: CPT

## 2023-02-08 PROCEDURE — 99214 OFFICE O/P EST MOD 30 MIN: CPT | Mod: 25

## 2023-02-08 NOTE — DISCUSSION/SUMMARY
[FreeTextEntry1] : \par Currently stable from a cardiovascular standpoint. Hypertensive (did not take any medications today). Euvolemic. No ischemic or CHF symptoms. Stable CAD (s/p CABG x3 vessels at [facility]?). Continue current medications. ECG completed today and reviewed (findings as noted above). Given patient’s CAD history and symptoms, will schedule a pharmacologic nuclear stress test to rule out any significant ischemia. In addition, will schedule an echocardiogram to assess his cardiac structures and function. Pending the test results, I will make further recommendations. Follow up in 3 months. [EKG obtained to assist in diagnosis and management of assessed problem(s)] : EKG obtained to assist in diagnosis and management of assessed problem(s) [Unlikely Cardiac Ischemia (Low Prob.)] : chest pain unlikely to represent cardiac ischemia (low probability) [Coronary Artery Disease] : coronary artery disease [Stable] : stable [Hypertension] : hypertension [Low Sodium Diet] : low sodium diet

## 2023-02-08 NOTE — REVIEW OF SYSTEMS
[SOB] : no shortness of breath [Dyspnea on exertion] : not dyspnea during exertion [Lower Ext Edema] : no extremity edema [Leg Claudication] : no intermittent leg claudication [Palpitations] : no palpitations [Orthopnea] : no orthopnea [PND] : no PND [Syncope] : no syncope [Chest Discomfort] : chest discomfort [Negative] : Musculoskeletal

## 2023-02-09 PROBLEM — R07.9 CHEST PAIN: Status: ACTIVE | Noted: 2022-10-27

## 2023-02-09 PROBLEM — I25.10 CAD (CORONARY ARTERY DISEASE): Status: ACTIVE | Noted: 2022-09-13

## 2023-02-09 PROBLEM — I10 HTN (HYPERTENSION): Status: ACTIVE | Noted: 2022-09-13

## 2023-02-09 NOTE — DISCUSSION/SUMMARY
[FreeTextEntry1] : \par Currently stable from a cardiovascular standpoint. Hypertensive today. Euvolemic. No ischemic or CHF symptoms. Stable CAD (s/p CABG x3 vessels at [facility]). Atypical right sided chest pains likely musculoskeletal in origin. Continue current medications. ECG completed today and reviewed (findings as noted above). Patient to have prior stress test and echo results forwarded for review. Pending review, I will make further recommendations. Low salt diet advised. Follow up in 4 months. [EKG obtained to assist in diagnosis and management of assessed problem(s)] : EKG obtained to assist in diagnosis and management of assessed problem(s)

## 2023-02-09 NOTE — REVIEW OF SYSTEMS
[SOB] : no shortness of breath [Dyspnea on exertion] : not dyspnea during exertion [Lower Ext Edema] : no extremity edema [Leg Claudication] : no intermittent leg claudication [Palpitations] : no palpitations [Orthopnea] : no orthopnea [PND] : no PND [Syncope] : no syncope

## 2023-02-09 NOTE — HISTORY OF PRESENT ILLNESS
[FreeTextEntry1] : Doing okay. Has issues with sense of smell (going on for 3 years now) and running nose. Occasional right sided chest pain which improves after eating garlic, Denies shortness of breath or palpitations. Since his last visit, he had stress test and echo with prior cardiologist. Testing seems to have been okay.

## 2023-02-09 NOTE — CARDIOLOGY SUMMARY
[de-identified] : \par 10/27/22 - consider sinus bradycardia, 59 bpm, old inferior infarct, nonspecific ST abnormality

## 2023-03-14 ENCOUNTER — RX RENEWAL (OUTPATIENT)
Age: 78
End: 2023-03-14

## 2023-03-14 RX ORDER — FINASTERIDE 5 MG/1
5 TABLET, FILM COATED ORAL
Qty: 30 | Refills: 5 | Status: ACTIVE | COMMUNITY
Start: 2021-12-08 | End: 1900-01-01

## 2023-08-24 NOTE — ED PROVIDER NOTE - DATE/TIME 1
Urinalysis- trace ketones. Otherwise stable  TSH- wnl  Lipid panel- wnl  CBC- wnl  CMP- wnl  HgbA1c- stable at 5.0 15-Aug-2022 04:43

## 2024-02-29 NOTE — PROGRESS NOTE ADULT - PROBLEM/PLAN-5
DISPLAY PLAN FREE TEXT
Yes